# Patient Record
Sex: MALE | HISPANIC OR LATINO | URBAN - METROPOLITAN AREA
[De-identification: names, ages, dates, MRNs, and addresses within clinical notes are randomized per-mention and may not be internally consistent; named-entity substitution may affect disease eponyms.]

---

## 2017-03-25 ENCOUNTER — EMERGENCY (EMERGENCY)
Facility: HOSPITAL | Age: 42
LOS: 0 days | Discharge: HOME | End: 2017-03-25
Attending: EMERGENCY MEDICINE | Admitting: EMERGENCY MEDICINE
Payer: SUBSIDIZED

## 2017-03-25 VITALS
OXYGEN SATURATION: 99 % | HEART RATE: 63 BPM | RESPIRATION RATE: 18 BRPM | DIASTOLIC BLOOD PRESSURE: 104 MMHG | TEMPERATURE: 99 F | SYSTOLIC BLOOD PRESSURE: 153 MMHG

## 2017-03-25 VITALS
HEART RATE: 62 BPM | SYSTOLIC BLOOD PRESSURE: 142 MMHG | RESPIRATION RATE: 18 BRPM | OXYGEN SATURATION: 99 % | DIASTOLIC BLOOD PRESSURE: 88 MMHG | TEMPERATURE: 98 F

## 2017-03-25 DIAGNOSIS — M54.9 DORSALGIA, UNSPECIFIED: ICD-10-CM

## 2017-03-25 DIAGNOSIS — M79.1 MYALGIA: ICD-10-CM

## 2017-03-25 PROCEDURE — 99284 EMERGENCY DEPT VISIT MOD MDM: CPT

## 2017-03-25 PROCEDURE — 71100 X-RAY EXAM RIBS UNI 2 VIEWS: CPT | Mod: 26,RT

## 2017-03-25 PROCEDURE — 71020: CPT | Mod: 26

## 2017-03-25 RX ORDER — IBUPROFEN 200 MG
600 TABLET ORAL ONCE
Qty: 0 | Refills: 0 | Status: COMPLETED | OUTPATIENT
Start: 2017-03-25 | End: 2017-03-25

## 2017-03-25 RX ADMIN — Medication 600 MILLIGRAM(S): at 10:38

## 2017-03-25 NOTE — ED ADULT NURSE NOTE - ED STAT RN HANDOFF DETAILS
Received care of patient from Super Track RN, Lexy Wallace. Patient resting comfortably will continue to monitor. Awaiting test results. Patient in no acute distress at this time.

## 2017-03-25 NOTE — ED STATDOCS - MEDICAL DECISION MAKING DETAILS
40 y/o M p/w right rib pain for months, worse with movement, appears musculoskeletal, will get xrays and give ibuprofen.

## 2017-03-25 NOTE — ED STATDOCS - PROGRESS NOTE DETAILS
41 yr. old male presents to ED with pain in right ribs for 5 months 41 yr. old male presents to ED with pain in right ribs for 5 months and low back pain. No numbness or tingling of extremities. No caudal anesthesia, no incontinence of urine or stool, Has not taken any OTC medications. 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 PE; PERRLA EOMS intact, Neck; non tender ROM; normal, Chest; Lungs clear, tender right mid axillary  rib cage  Abd; non tender  Back; Spine non tender tender lumbar paravetebral muscles ROM; flexion and lateral bend normal SLR; Neg chirag. Plan: Chest X-Ray and Rib series Ibuprofen for pain. Will F/U with X-Ray and re evaluate. [place] ART

## 2017-03-25 NOTE — ED STATDOCS - OBJECTIVE STATEMENT
42 y/o M presents to the ED c/o right rib pain 5 months. Pt states pain is worse with movement. Pt has not taken any OTC medications for pain. Currently pt has no other complaints and denies n/v/d, fever, dysuria, hematuria, CP, and SOB.

## 2017-03-25 NOTE — ED STATDOCS - NS ED MD SCRIBE ATTENDING SCRIBE SECTIONS
HISTORY OF PRESENT ILLNESS/PHYSICAL EXAM/RESULTS/PAST MEDICAL/SURGICAL/SOCIAL HISTORY/DISPOSITION/PROGRESS NOTE/REVIEW OF SYSTEMS

## 2017-03-25 NOTE — ED STATDOCS - ATTENDING CONTRIBUTION TO CARE
I, Fareed Varner, performed the initial face to face bedside interview with this patient regarding history of present illness, review of symptoms and relevant past medical, social and family history.  I completed an independent physical examination.  I was the initial provider who evaluated this patient. I have signed out the follow up of any pending tests (i.e. labs, radiological studies) to the ACP.  I have communicated the patient’s plan of care and disposition with the ACP.  The history, relevant review of systems, past medical and surgical history, medical decision making, and physical examination was documented by the scribe in my presence and I attest to the accuracy of the documentation.

## 2018-03-12 ENCOUNTER — EMERGENCY (EMERGENCY)
Facility: HOSPITAL | Age: 43
LOS: 0 days | Discharge: HOME | End: 2018-03-12
Attending: EMERGENCY MEDICINE | Admitting: EMERGENCY MEDICINE
Payer: MEDICAID

## 2018-03-12 VITALS
HEART RATE: 77 BPM | DIASTOLIC BLOOD PRESSURE: 87 MMHG | RESPIRATION RATE: 16 BRPM | SYSTOLIC BLOOD PRESSURE: 145 MMHG | TEMPERATURE: 98 F | OXYGEN SATURATION: 100 %

## 2018-03-12 VITALS
RESPIRATION RATE: 18 BRPM | HEART RATE: 72 BPM | SYSTOLIC BLOOD PRESSURE: 141 MMHG | OXYGEN SATURATION: 100 % | DIASTOLIC BLOOD PRESSURE: 105 MMHG | TEMPERATURE: 98 F

## 2018-03-12 DIAGNOSIS — R30.0 DYSURIA: ICD-10-CM

## 2018-03-12 DIAGNOSIS — Z11.3 ENCOUNTER FOR SCREENING FOR INFECTIONS WITH A PREDOMINANTLY SEXUAL MODE OF TRANSMISSION: ICD-10-CM

## 2018-03-12 DIAGNOSIS — R35.0 FREQUENCY OF MICTURITION: ICD-10-CM

## 2018-03-12 LAB
ALBUMIN SERPL ELPH-MCNC: 4.4 G/DL — SIGNIFICANT CHANGE UP (ref 3.3–5)
ALP SERPL-CCNC: 123 U/L — HIGH (ref 40–120)
ALT FLD-CCNC: 18 U/L — SIGNIFICANT CHANGE UP (ref 12–78)
ANION GAP SERPL CALC-SCNC: 9 MMOL/L — SIGNIFICANT CHANGE UP (ref 5–17)
APPEARANCE UR: CLEAR — SIGNIFICANT CHANGE UP
AST SERPL-CCNC: 48 U/L — HIGH (ref 15–37)
BACTERIA # UR AUTO: (no result)
BASOPHILS # BLD AUTO: 0.04 K/UL — SIGNIFICANT CHANGE UP (ref 0–0.2)
BASOPHILS NFR BLD AUTO: 0.7 % — SIGNIFICANT CHANGE UP (ref 0–2)
BILIRUB SERPL-MCNC: 0.8 MG/DL — SIGNIFICANT CHANGE UP (ref 0.2–1.2)
BILIRUB UR-MCNC: NEGATIVE — SIGNIFICANT CHANGE UP
BUN SERPL-MCNC: 9 MG/DL — SIGNIFICANT CHANGE UP (ref 7–23)
CALCIUM SERPL-MCNC: 8.9 MG/DL — SIGNIFICANT CHANGE UP (ref 8.5–10.1)
CHLORIDE SERPL-SCNC: 102 MMOL/L — SIGNIFICANT CHANGE UP (ref 96–108)
CO2 SERPL-SCNC: 26 MMOL/L — SIGNIFICANT CHANGE UP (ref 22–31)
COLOR SPEC: YELLOW — SIGNIFICANT CHANGE UP
COMMENT - URINE: SIGNIFICANT CHANGE UP
CREAT SERPL-MCNC: 0.94 MG/DL — SIGNIFICANT CHANGE UP (ref 0.5–1.3)
DIFF PNL FLD: (no result)
EOSINOPHIL # BLD AUTO: 0.04 K/UL — SIGNIFICANT CHANGE UP (ref 0–0.5)
EOSINOPHIL NFR BLD AUTO: 0.7 % — SIGNIFICANT CHANGE UP (ref 0–6)
EPI CELLS # UR: SIGNIFICANT CHANGE UP
GLUCOSE SERPL-MCNC: 102 MG/DL — HIGH (ref 70–99)
GLUCOSE UR QL: NEGATIVE MG/DL — SIGNIFICANT CHANGE UP
HCT VFR BLD CALC: 52.7 % — HIGH (ref 39–50)
HGB BLD-MCNC: 18.8 G/DL — HIGH (ref 13–17)
HYALINE CASTS # UR AUTO: (no result) /LPF
IMM GRANULOCYTES NFR BLD AUTO: 0.7 % — SIGNIFICANT CHANGE UP (ref 0–1.5)
KETONES UR-MCNC: NEGATIVE — SIGNIFICANT CHANGE UP
LEUKOCYTE ESTERASE UR-ACNC: NEGATIVE — SIGNIFICANT CHANGE UP
LYMPHOCYTES # BLD AUTO: 1.43 K/UL — SIGNIFICANT CHANGE UP (ref 1–3.3)
LYMPHOCYTES # BLD AUTO: 23.6 % — SIGNIFICANT CHANGE UP (ref 13–44)
MCHC RBC-ENTMCNC: 31.9 PG — SIGNIFICANT CHANGE UP (ref 27–34)
MCHC RBC-ENTMCNC: 35.7 GM/DL — SIGNIFICANT CHANGE UP (ref 32–36)
MCV RBC AUTO: 89.3 FL — SIGNIFICANT CHANGE UP (ref 80–100)
MONOCYTES # BLD AUTO: 0.54 K/UL — SIGNIFICANT CHANGE UP (ref 0–0.9)
MONOCYTES NFR BLD AUTO: 8.9 % — SIGNIFICANT CHANGE UP (ref 2–14)
NEUTROPHILS # BLD AUTO: 3.97 K/UL — SIGNIFICANT CHANGE UP (ref 1.8–7.4)
NEUTROPHILS NFR BLD AUTO: 65.4 % — SIGNIFICANT CHANGE UP (ref 43–77)
NITRITE UR-MCNC: NEGATIVE — SIGNIFICANT CHANGE UP
NRBC # BLD: 0 /100 WBCS — SIGNIFICANT CHANGE UP (ref 0–0)
PH UR: 5 — SIGNIFICANT CHANGE UP (ref 5–8)
PLATELET # BLD AUTO: 266 K/UL — SIGNIFICANT CHANGE UP (ref 150–400)
POTASSIUM SERPL-MCNC: 4.1 MMOL/L — SIGNIFICANT CHANGE UP (ref 3.5–5.3)
POTASSIUM SERPL-SCNC: 4.1 MMOL/L — SIGNIFICANT CHANGE UP (ref 3.5–5.3)
PROT SERPL-MCNC: 8.5 GM/DL — HIGH (ref 6–8.3)
PROT UR-MCNC: 15 MG/DL
RBC # BLD: 5.9 M/UL — HIGH (ref 4.2–5.8)
RBC # FLD: 12 % — SIGNIFICANT CHANGE UP (ref 10.3–14.5)
RBC CASTS # UR COMP ASSIST: SIGNIFICANT CHANGE UP /HPF (ref 0–4)
SODIUM SERPL-SCNC: 137 MMOL/L — SIGNIFICANT CHANGE UP (ref 135–145)
SP GR SPEC: 1.02 — SIGNIFICANT CHANGE UP (ref 1.01–1.02)
UROBILINOGEN FLD QL: NEGATIVE MG/DL — SIGNIFICANT CHANGE UP
WBC # BLD: 6.06 K/UL — SIGNIFICANT CHANGE UP (ref 3.8–10.5)
WBC # FLD AUTO: 6.06 K/UL — SIGNIFICANT CHANGE UP (ref 3.8–10.5)
WBC UR QL: SIGNIFICANT CHANGE UP

## 2018-03-12 PROCEDURE — 99284 EMERGENCY DEPT VISIT MOD MDM: CPT | Mod: 25

## 2018-03-12 RX ORDER — SODIUM CHLORIDE 9 MG/ML
1000 INJECTION INTRAMUSCULAR; INTRAVENOUS; SUBCUTANEOUS
Qty: 0 | Refills: 0 | Status: DISCONTINUED | OUTPATIENT
Start: 2018-03-12 | End: 2018-03-12

## 2018-03-12 RX ORDER — SODIUM CHLORIDE 9 MG/ML
1000 INJECTION INTRAMUSCULAR; INTRAVENOUS; SUBCUTANEOUS ONCE
Qty: 0 | Refills: 0 | Status: COMPLETED | OUTPATIENT
Start: 2018-03-12 | End: 2018-03-12

## 2018-03-12 RX ORDER — SODIUM CHLORIDE 9 MG/ML
3 INJECTION INTRAMUSCULAR; INTRAVENOUS; SUBCUTANEOUS ONCE
Qty: 0 | Refills: 0 | Status: COMPLETED | OUTPATIENT
Start: 2018-03-12 | End: 2018-03-12

## 2018-03-12 RX ORDER — CEFTRIAXONE 500 MG/1
250 INJECTION, POWDER, FOR SOLUTION INTRAMUSCULAR; INTRAVENOUS ONCE
Qty: 0 | Refills: 0 | Status: COMPLETED | OUTPATIENT
Start: 2018-03-12 | End: 2018-03-12

## 2018-03-12 RX ORDER — AZITHROMYCIN 500 MG/1
1000 TABLET, FILM COATED ORAL ONCE
Qty: 0 | Refills: 0 | Status: COMPLETED | OUTPATIENT
Start: 2018-03-12 | End: 2018-03-12

## 2018-03-12 RX ADMIN — SODIUM CHLORIDE 3 MILLILITER(S): 9 INJECTION INTRAMUSCULAR; INTRAVENOUS; SUBCUTANEOUS at 10:39

## 2018-03-12 RX ADMIN — AZITHROMYCIN 1000 MILLIGRAM(S): 500 TABLET, FILM COATED ORAL at 12:28

## 2018-03-12 RX ADMIN — CEFTRIAXONE 250 MILLIGRAM(S): 500 INJECTION, POWDER, FOR SOLUTION INTRAMUSCULAR; INTRAVENOUS at 12:28

## 2018-03-12 RX ADMIN — SODIUM CHLORIDE 1000 MILLILITER(S): 9 INJECTION INTRAMUSCULAR; INTRAVENOUS; SUBCUTANEOUS at 10:47

## 2018-03-12 NOTE — ED ADULT NURSE NOTE - CHIEF COMPLAINT QUOTE
pt Malawian speaking only information obtained in Malawian , states abd pain for the past 5 days worsening at night. denies n/v/d

## 2018-03-12 NOTE — ED ADULT TRIAGE NOTE - CHIEF COMPLAINT QUOTE
pt Danish speaking only information obtained in Danish , states abd pain for the past 5 days worsening at night. denies n/v/d

## 2018-03-12 NOTE — ED STATDOCS - PROGRESS NOTE DETAILS
43 yo male with a PMH of htn presents with frequent urination and dysuria (primarily at night) x few months. Never was evaluated by anyone for it. Pt sexually active. Denies rashes, discharge, fever, abd pain.  exam: testicles in vertical lie, nontender, no masses or edema. No rashes. -Vic Eng PA-C  ID [id number]. Reevaluated patient at bedside.  Patient feeling much improved.  Discussed the results of all diagnostic testing in ED and copies of all reports given.   An opportunity to ask questions was given.  Discussed the importance of prompt, close medical follow-up.  Patient will return with any changes, concerns or persistent / worsening symptoms.  Understanding of all instructions verbalized.

## 2018-03-12 NOTE — ED STATDOCS - OBJECTIVE STATEMENT
41 y/o male with PMHx of HTN presents to the ED c/o penile burning and frequent urination starting 5 months ago. Pt states pain is worse at night and during urination. Has not seen a PCP for sx. Denies NVD, fever. NKDA.

## 2018-03-13 LAB
C TRACH RRNA SPEC QL NAA+PROBE: SIGNIFICANT CHANGE UP
N GONORRHOEA RRNA SPEC QL NAA+PROBE: SIGNIFICANT CHANGE UP
SPECIMEN SOURCE: SIGNIFICANT CHANGE UP

## 2018-10-02 ENCOUNTER — EMERGENCY (EMERGENCY)
Facility: HOSPITAL | Age: 43
LOS: 0 days | Discharge: HOME | End: 2018-10-02
Attending: EMERGENCY MEDICINE | Admitting: EMERGENCY MEDICINE
Payer: MEDICAID

## 2018-10-02 VITALS
SYSTOLIC BLOOD PRESSURE: 156 MMHG | TEMPERATURE: 99 F | DIASTOLIC BLOOD PRESSURE: 97 MMHG | HEART RATE: 70 BPM | OXYGEN SATURATION: 100 % | RESPIRATION RATE: 18 BRPM

## 2018-10-02 VITALS — WEIGHT: 169.98 LBS | HEIGHT: 62 IN

## 2018-10-02 DIAGNOSIS — S99.921A UNSPECIFIED INJURY OF RIGHT FOOT, INITIAL ENCOUNTER: ICD-10-CM

## 2018-10-02 DIAGNOSIS — S92.314A NONDISPLACED FRACTURE OF FIRST METATARSAL BONE, RIGHT FOOT, INITIAL ENCOUNTER FOR CLOSED FRACTURE: ICD-10-CM
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
## 2018-10-02 DIAGNOSIS — S92.324A NONDISPLACED FRACTURE OF SECOND METATARSAL BONE, RIGHT FOOT, INITIAL ENCOUNTER FOR CLOSED FRACTURE: ICD-10-CM
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
## 2018-10-02 PROCEDURE — 73630 X-RAY EXAM OF FOOT: CPT | Mod: 26,RT

## 2018-10-02 PROCEDURE — 99284 EMERGENCY DEPT VISIT MOD MDM: CPT

## 2018-10-02 RX ORDER — IBUPROFEN 200 MG
600 TABLET ORAL ONCE
Qty: 0 | Refills: 0 | Status: COMPLETED | OUTPATIENT
Start: 2018-10-02 | End: 2018-10-02

## 2018-10-02 RX ADMIN — Medication 600 MILLIGRAM(S): at 13:59

## 2018-10-02 NOTE — ED ADULT TRIAGE NOTE - CHIEF COMPLAINT QUOTE
Patient presents with right foot wound reports he was at work and had rock fall on foot reports able to move toes unable to walk on foot. Patient applied ice directly

## 2018-10-02 NOTE — ED ADULT NURSE NOTE - NSFALLRSKHARMRISK_ED_ALL_ED
Immediate Brief Procedure Note    Patient: Sundeep Garcia    Pre-op Dx: Symptomatic cholelithiasis, probable chronic cholecystitis    Post-op Dx: Same    Procedure: Laporoscopic cholecystectomy with intra-cholangiogram     Surgeon:  Chad Morales MD    Assistants: Yadira Lyn NP    Anesthesia Staff: Anesthesiologist: Iris Ramey MD  Anesthesia Assistant: Andres Priest    Anesthesia Type: Gen.    Findings: Single 1-1/2 cm gallstone within the gallbladder. Normal cholangiogram with flow through all ducts, through ampulla and into duodenum without filling defects    Estimated Blood Loss: Minimal    Complications: None    Specimens Removed: Gallbladder  
no

## 2018-10-02 NOTE — ED STATDOCS - MUSCULOSKELETAL, MLM
Mild TTP to distal aspect of dorsal right foot, ROM of toes limited secondary to pain, FROM of the ankle, cap refill intact

## 2018-10-02 NOTE — ED ADULT NURSE NOTE - NSIMPLEMENTINTERV_GEN_ALL_ED
Implemented All Universal Safety Interventions:  [place] to call system. Call bell, personal items and telephone within reach. Instruct patient to call for assistance. Room bathroom lighting operational. Non-slip footwear when patient is off stretcher. Physically safe environment: no spills, clutter or unnecessary equipment. Stretcher in lowest position, wheels locked, appropriate side rails in place.

## 2018-10-02 NOTE — ED STATDOCS - OBJECTIVE STATEMENT
44 y/o male with a PMHx of HLD presents to the ED c/o right foot injury. Pt notes 2 hours ago, he dropped a stone on his right foot while at work. Pt was wearing work boots during incident. Pt states he is experiencing pain in the foot and is not able to ambulate secondary to pain. NKDA. [place]  #[id number]

## 2018-10-02 NOTE — ED STATDOCS - CARE PLAN
Principal Discharge DX:	Closed nondisplaced fracture of first metatarsal bone of right foot, initial encounter  Secondary Diagnosis:	Closed nondisplaced fracture of second metatarsal bone of right foot, initial encounter

## 2018-10-02 NOTE — ED STATDOCS - PROGRESS NOTE DETAILS
44 yo male presents with R foot pain s/p 100lb stone falling on foot at work approx 2 hours pta. Pt was wearing work boots at the time. Denies f/numbness, tingling. Hematoma over the distal dorsal R foot. XR. Reeval. -Vic Eng PA-C Xr showing 1st and 2nd metatarsal fractures. Podiatry called and will come o see the pt. -Vic Eng PA-C

## 2018-10-02 NOTE — ED STATDOCS - NS_ ATTENDINGSCRIBEDETAILS _ED_A_ED_FT
David Moralez DO (Attending): The history, relevant review of systems, past medical and surgical history, medical decision making, and physical examination was documented by the scribe in my presence and I attest to the accuracy of the documentation.

## 2018-10-12 PROBLEM — E78.5 HYPERLIPIDEMIA, UNSPECIFIED: Chronic | Status: INACTIVE | Noted: 2018-10-02 | Resolved: 2018-10-11

## 2018-11-09 ENCOUNTER — INPATIENT (INPATIENT)
Facility: HOSPITAL | Age: 43
LOS: 4 days | Discharge: HOME HEALTH | End: 2018-11-14
Attending: INTERNAL MEDICINE | Admitting: INTERNAL MEDICINE
Payer: SELF-PAY

## 2018-11-09 VITALS — HEIGHT: 61 IN | WEIGHT: 138.01 LBS

## 2018-11-09 PROBLEM — E78.5 HYPERLIPIDEMIA, UNSPECIFIED: Chronic | Status: ACTIVE | Noted: 2018-10-11

## 2018-11-09 LAB
ALBUMIN SERPL ELPH-MCNC: 3.9 G/DL — SIGNIFICANT CHANGE UP (ref 3.3–5)
ALP SERPL-CCNC: 79 U/L — SIGNIFICANT CHANGE UP (ref 40–120)
ALT FLD-CCNC: 29 U/L — SIGNIFICANT CHANGE UP (ref 12–78)
ANION GAP SERPL CALC-SCNC: 9 MMOL/L — SIGNIFICANT CHANGE UP (ref 5–17)
APTT BLD: 29.1 SEC — SIGNIFICANT CHANGE UP (ref 27.5–36.3)
AST SERPL-CCNC: 30 U/L — SIGNIFICANT CHANGE UP (ref 15–37)
BASOPHILS # BLD AUTO: 0.02 K/UL — SIGNIFICANT CHANGE UP (ref 0–0.2)
BASOPHILS NFR BLD AUTO: 0.3 % — SIGNIFICANT CHANGE UP (ref 0–2)
BILIRUB SERPL-MCNC: 0.8 MG/DL — SIGNIFICANT CHANGE UP (ref 0.2–1.2)
BLD GP AB SCN SERPL QL: SIGNIFICANT CHANGE UP
BUN SERPL-MCNC: 13 MG/DL — SIGNIFICANT CHANGE UP (ref 7–23)
CALCIUM SERPL-MCNC: 8.8 MG/DL — SIGNIFICANT CHANGE UP (ref 8.5–10.1)
CHLORIDE SERPL-SCNC: 105 MMOL/L — SIGNIFICANT CHANGE UP (ref 96–108)
CK SERPL-CCNC: 86 U/L — SIGNIFICANT CHANGE UP (ref 26–308)
CO2 SERPL-SCNC: 27 MMOL/L — SIGNIFICANT CHANGE UP (ref 22–31)
CREAT SERPL-MCNC: 0.8 MG/DL — SIGNIFICANT CHANGE UP (ref 0.5–1.3)
EOSINOPHIL # BLD AUTO: 0.03 K/UL — SIGNIFICANT CHANGE UP (ref 0–0.5)
EOSINOPHIL NFR BLD AUTO: 0.5 % — SIGNIFICANT CHANGE UP (ref 0–6)
GLUCOSE SERPL-MCNC: 93 MG/DL — SIGNIFICANT CHANGE UP (ref 70–99)
HCT VFR BLD CALC: 46.8 % — SIGNIFICANT CHANGE UP (ref 39–50)
HGB BLD-MCNC: 16.6 G/DL — SIGNIFICANT CHANGE UP (ref 13–17)
IMM GRANULOCYTES NFR BLD AUTO: 0.3 % — SIGNIFICANT CHANGE UP (ref 0–1.5)
INR BLD: 1.02 RATIO — SIGNIFICANT CHANGE UP (ref 0.88–1.16)
LYMPHOCYTES # BLD AUTO: 1.27 K/UL — SIGNIFICANT CHANGE UP (ref 1–3.3)
LYMPHOCYTES # BLD AUTO: 21.7 % — SIGNIFICANT CHANGE UP (ref 13–44)
MCHC RBC-ENTMCNC: 32.4 PG — SIGNIFICANT CHANGE UP (ref 27–34)
MCHC RBC-ENTMCNC: 35.5 GM/DL — SIGNIFICANT CHANGE UP (ref 32–36)
MCV RBC AUTO: 91.2 FL — SIGNIFICANT CHANGE UP (ref 80–100)
MONOCYTES # BLD AUTO: 0.53 K/UL — SIGNIFICANT CHANGE UP (ref 0–0.9)
MONOCYTES NFR BLD AUTO: 9.1 % — SIGNIFICANT CHANGE UP (ref 2–14)
NEUTROPHILS # BLD AUTO: 3.98 K/UL — SIGNIFICANT CHANGE UP (ref 1.8–7.4)
NEUTROPHILS NFR BLD AUTO: 68.1 % — SIGNIFICANT CHANGE UP (ref 43–77)
NRBC # BLD: 0 /100 WBCS — SIGNIFICANT CHANGE UP (ref 0–0)
PLATELET # BLD AUTO: 240 K/UL — SIGNIFICANT CHANGE UP (ref 150–400)
POTASSIUM SERPL-MCNC: 3.5 MMOL/L — SIGNIFICANT CHANGE UP (ref 3.5–5.3)
POTASSIUM SERPL-SCNC: 3.5 MMOL/L — SIGNIFICANT CHANGE UP (ref 3.5–5.3)
PROT SERPL-MCNC: 7.5 GM/DL — SIGNIFICANT CHANGE UP (ref 6–8.3)
PROTHROM AB SERPL-ACNC: 11.3 SEC — SIGNIFICANT CHANGE UP (ref 10–12.9)
RBC # BLD: 5.13 M/UL — SIGNIFICANT CHANGE UP (ref 4.2–5.8)
RBC # FLD: 11.9 % — SIGNIFICANT CHANGE UP (ref 10.3–14.5)
SODIUM SERPL-SCNC: 141 MMOL/L — SIGNIFICANT CHANGE UP (ref 135–145)
TYPE + AB SCN PNL BLD: SIGNIFICANT CHANGE UP
WBC # BLD: 5.85 K/UL — SIGNIFICANT CHANGE UP (ref 3.8–10.5)
WBC # FLD AUTO: 5.85 K/UL — SIGNIFICANT CHANGE UP (ref 3.8–10.5)

## 2018-11-09 PROCEDURE — 93010 ELECTROCARDIOGRAM REPORT: CPT

## 2018-11-09 PROCEDURE — 99285 EMERGENCY DEPT VISIT HI MDM: CPT

## 2018-11-09 RX ORDER — OXYCODONE HYDROCHLORIDE 5 MG/1
10 TABLET ORAL ONCE
Qty: 0 | Refills: 0 | Status: DISCONTINUED | OUTPATIENT
Start: 2018-11-09 | End: 2018-11-09

## 2018-11-09 RX ORDER — FENTANYL CITRATE 50 UG/ML
50 INJECTION INTRAVENOUS
Qty: 0 | Refills: 0 | Status: DISCONTINUED | OUTPATIENT
Start: 2018-11-09 | End: 2018-11-09

## 2018-11-09 RX ORDER — ONDANSETRON 8 MG/1
4 TABLET, FILM COATED ORAL EVERY 6 HOURS
Qty: 0 | Refills: 0 | Status: DISCONTINUED | OUTPATIENT
Start: 2018-11-09 | End: 2018-11-14

## 2018-11-09 RX ORDER — HYDROCHLOROTHIAZIDE 25 MG
12.5 TABLET ORAL DAILY
Qty: 0 | Refills: 0 | Status: DISCONTINUED | OUTPATIENT
Start: 2018-11-09 | End: 2018-11-14

## 2018-11-09 RX ORDER — ACETAMINOPHEN 500 MG
1000 TABLET ORAL ONCE
Qty: 0 | Refills: 0 | Status: COMPLETED | OUTPATIENT
Start: 2018-11-09 | End: 2018-11-09

## 2018-11-09 RX ORDER — OXYCODONE HYDROCHLORIDE 5 MG/1
10 TABLET ORAL EVERY 4 HOURS
Qty: 0 | Refills: 0 | Status: DISCONTINUED | OUTPATIENT
Start: 2018-11-09 | End: 2018-11-14

## 2018-11-09 RX ORDER — SODIUM CHLORIDE 9 MG/ML
1000 INJECTION INTRAMUSCULAR; INTRAVENOUS; SUBCUTANEOUS
Qty: 0 | Refills: 0 | Status: DISCONTINUED | OUTPATIENT
Start: 2018-11-09 | End: 2018-11-09

## 2018-11-09 RX ORDER — SODIUM CHLORIDE 9 MG/ML
1000 INJECTION INTRAMUSCULAR; INTRAVENOUS; SUBCUTANEOUS
Qty: 0 | Refills: 0 | Status: DISCONTINUED | OUTPATIENT
Start: 2018-11-09 | End: 2018-11-14

## 2018-11-09 RX ORDER — ACETAMINOPHEN 500 MG
650 TABLET ORAL EVERY 6 HOURS
Qty: 0 | Refills: 0 | Status: DISCONTINUED | OUTPATIENT
Start: 2018-11-09 | End: 2018-11-14

## 2018-11-09 RX ORDER — OXYCODONE HYDROCHLORIDE 5 MG/1
5 TABLET ORAL EVERY 4 HOURS
Qty: 0 | Refills: 0 | Status: DISCONTINUED | OUTPATIENT
Start: 2018-11-09 | End: 2018-11-14

## 2018-11-09 RX ORDER — DOCUSATE SODIUM 100 MG
100 CAPSULE ORAL DAILY
Qty: 0 | Refills: 0 | Status: DISCONTINUED | OUTPATIENT
Start: 2018-11-09 | End: 2018-11-14

## 2018-11-09 RX ORDER — HYDROMORPHONE HYDROCHLORIDE 2 MG/ML
0.5 INJECTION INTRAMUSCULAR; INTRAVENOUS; SUBCUTANEOUS
Qty: 0 | Refills: 0 | Status: DISCONTINUED | OUTPATIENT
Start: 2018-11-09 | End: 2018-11-14

## 2018-11-09 RX ORDER — ENOXAPARIN SODIUM 100 MG/ML
40 INJECTION SUBCUTANEOUS EVERY 24 HOURS
Qty: 0 | Refills: 0 | Status: DISCONTINUED | OUTPATIENT
Start: 2018-11-09 | End: 2018-11-14

## 2018-11-09 RX ORDER — INFLUENZA VIRUS VACCINE 15; 15; 15; 15 UG/.5ML; UG/.5ML; UG/.5ML; UG/.5ML
0.5 SUSPENSION INTRAMUSCULAR ONCE
Qty: 0 | Refills: 0 | Status: COMPLETED | OUTPATIENT
Start: 2018-11-09 | End: 2018-11-10

## 2018-11-09 RX ADMIN — Medication 400 MILLIGRAM(S): at 16:16

## 2018-11-09 RX ADMIN — HYDROMORPHONE HYDROCHLORIDE 0.5 MILLIGRAM(S): 2 INJECTION INTRAMUSCULAR; INTRAVENOUS; SUBCUTANEOUS at 18:13

## 2018-11-09 RX ADMIN — HYDROMORPHONE HYDROCHLORIDE 0.5 MILLIGRAM(S): 2 INJECTION INTRAMUSCULAR; INTRAVENOUS; SUBCUTANEOUS at 18:30

## 2018-11-09 RX ADMIN — Medication 1000 MILLIGRAM(S): at 17:00

## 2018-11-09 RX ADMIN — OXYCODONE HYDROCHLORIDE 10 MILLIGRAM(S): 5 TABLET ORAL at 18:00

## 2018-11-09 RX ADMIN — HYDROMORPHONE HYDROCHLORIDE 0.5 MILLIGRAM(S): 2 INJECTION INTRAMUSCULAR; INTRAVENOUS; SUBCUTANEOUS at 20:57

## 2018-11-09 RX ADMIN — OXYCODONE HYDROCHLORIDE 5 MILLIGRAM(S): 5 TABLET ORAL at 17:00

## 2018-11-09 RX ADMIN — HYDROMORPHONE HYDROCHLORIDE 0.5 MILLIGRAM(S): 2 INJECTION INTRAMUSCULAR; INTRAVENOUS; SUBCUTANEOUS at 20:46

## 2018-11-09 RX ADMIN — SODIUM CHLORIDE 75 MILLILITER(S): 9 INJECTION INTRAMUSCULAR; INTRAVENOUS; SUBCUTANEOUS at 16:17

## 2018-11-09 NOTE — H&P ADULT - NSHPPHYSICALEXAM_GEN_ALL_CORE
ICU Vital Signs Last 24 Hrs    T(F): 98 (09 Nov 2018 16:05), Max: 98.2 (09 Nov 2018 13:29)  HR: 78 (09 Nov 2018 17:30) (76 - 110)  BP: 128/85 (09 Nov 2018 17:30) (128/85 - 161/90)  -  RR: 12 (09 Nov 2018 17:30) (12 - 13)  SpO2: 100% (09 Nov 2018 17:30) (98% - 100%)

## 2018-11-09 NOTE — ED ADULT NURSE NOTE - CHPI ED NUR SYMPTOMS NEG
no stiffness/no difficulty bearing weight/no tingling/no bruising/no back pain/no deformity/no weakness/no fever/no numbness/no abrasion

## 2018-11-09 NOTE — ED PROVIDER NOTE - OBJECTIVE STATEMENT
42 y/o male with PMHx of HLD presents to the ED sent by podiatry s/o right foot swelling s/p fx 6 weeks ago. Pt was sent to the ED to r/o compartment syndrome. Pt had a right foot fx 6 weeks ago s/p injury at work. Denies NVD, fever, numbness, tingling or other sx.

## 2018-11-09 NOTE — CONSULT NOTE ADULT - ATTENDING COMMENTS
Patient was sent to the ER after seeing me in the office today.  Patient had a palpable pulse at the DP and PT.  However patient had signs of compartment syndrome consisting of skin changes, cold feet, pain with active pf and df of toes, inability to bend toes, and hard skin dorsal medial which was noted as relatively supple dorsal lateral.  Patient was sent to ER to measure compartment pressures.  I do not have the appropriate tools in my office.  Once a pressure above 30 was noted he was booked emergently in the ER for fasciotomy.  Patient to be admitted to medicine for pain control.

## 2018-11-09 NOTE — ED PROVIDER NOTE - PROGRESS NOTE DETAILS
Alex MACHUCA for Dr. Ordaz: Spoke to podiatry resident who will come see pt in the ED at bedside. Sushma WINTER: Per podiatry- will take patient to OR; to be admitted to medicine service as podiatry with no admitting privileges; endorsed to Dr. Langley.

## 2018-11-09 NOTE — BRIEF OPERATIVE NOTE - OPERATION/FINDINGS
-Significant amount of blood was drained from the dorsal incision of the right foot (medial to then 2nd metatarsal) and dorsal skin was noted to be less taut.  -Healthy viable soft tissue noted at the incision site of the right foot  -3-0 vicryl used to tie off small bleeders  -DP pulse of right foot palpated after fasciotomy was performed. -Significant amount of blood was drained from the dorsal incision of the right foot (medial to then 2nd metatarsal) and dorsal skin was noted to be less taut.  Probing was performed to release pressures in all compartments that were accessible through the dorsal second met incision going both medial and lateral to the second met and deep to the plantar foot  -Healthy viable soft tissue noted at the incision site of the right foot  -3-0 vicryl used to tie off small bleeders  -DP pulse of right foot palpated after fasciotomy was performed.

## 2018-11-09 NOTE — ED PROVIDER NOTE - MEDICAL DECISION MAKING DETAILS
42 y/o male with known foot fx sent to ED by podiatry to r/o compartment syndrome. Will consult podiatry.

## 2018-11-09 NOTE — ED ADULT NURSE NOTE - NSIMPLEMENTINTERV_GEN_ALL_ED
Implemented All Universal Safety Interventions:  [place] to call system. Call bell, personal items and telephone within reach. Instruct patient to call for assistance. Room bathroom lighting operational. Non-slip footwear when patient is off stretcher. Physically safe environment: no spills, clutter or unnecessary equipment. Stretcher in lowest position, wheels locked, appropriate side rails in place.

## 2018-11-09 NOTE — CHART NOTE - NSCHARTNOTEFT_GEN_A_CORE
44 y/o male seen in the  ED on 11/9 for evaluation of right foot compartment syndrome. Pt's boss is present. Pt speaks Bulgarian and  Melissa Ervin ID#[id number] assisted via telephone. Pt was sent to the ED by Dr. Fareed Porter to rule out compartment syndrome of the right foot. Pt sustained a nondisplaced fracture of the right foot 1st and 2nd metatarsal shafts and was placed in a posterior splint/ Pt was then transitioned into a walking CAM boot. Pt was seen Dr. Porter's office with complaints of not being able to move his right foot toes, having loss of sensation to his right foot and having pain with passive flexion of his right foot toes. Pt denies any pedal complaints to his left foot. Pt denies any f/c/n/v/sob/headache/chest pain/abdominal pain.      44 y/o male was seen at bedside for post op check s/p right foot emergent fasciotomy for the treatment of compartment syndrome earlier this afternoon (11/9/18). Pt is resting comfortably in bed and in NAD. Pt's friend is present bedside. {t states he has minimal to no pain of the right foot. Pt denies any f/c/n/v/sob/headache/chest pain/abdominal pain.    PE:  Pt is AAO x3, NAD  RLE: Surgical dressing on the right foot is clean, dry and intact. No strikethrough noted. All digits of the right foot are warm, well perfused with good color. Right calf is soft and nontender. No right calf squeeze.    -Pt was seen and examined. Pt discussed with attending Dr. Fareed Porter.  -Surgical dressing on the right foot was left intact during this visit.  -Monitor labs and vitals.  -Recommend abx.  -Pain management.  -Care per Medicine, appreciated.   -Podiatry to follow in house. 44 y/o male seen in the  ED on 11/9 for evaluation of right foot compartment syndrome. Pt's boss is present. Pt speaks [place] and  Melissa Ervin ID#[id number] assisted via telephone. Pt was sent to the ED by Dr. Fareed Porter to rule out compartment syndrome of the right foot. Pt sustained a nondisplaced fracture of the right foot 1st and 2nd metatarsal shafts and was placed in a posterior splint/ Pt was then transitioned into a walking CAM boot. Pt was seen Dr. Porter's office with complaints of not being able to move his right foot toes, having loss of sensation to his right foot and having pain with passive flexion of his right foot toes. Pt denies any pedal complaints to his left foot. Pt denies any f/c/n/v/sob/headache/chest pain/abdominal pain.      44 y/o male was seen at bedside for post op check s/p right foot emergent fasciotomy for the treatment of compartment syndrome earlier this afternoon (11/9/18). Pt is resting comfortably in bed and in NAD. Pt's friend is present bedside. He states he has minimal to no pain of the right foot. Pt denies any f/c/n/v/sob/headache/chest pain/abdominal pain.    PE:  Pt is AAO x3, NAD  RLE: Surgical dressing on the right foot is clean, dry and intact. No strikethrough noted. All digits of the right foot are warm, well perfused with good color. Right calf is soft and nontender. No right calf squeeze.    -Pt was seen and examined. Pt discussed with attending Dr. Fareed Porter.  -Surgical dressing on the right foot was left intact during this visit.  -Monitor labs and vitals.  -Recommend abx.  -Pain management.  -Care per Medicine, appreciated.   - We will attempt to obtain a wound vac for patient for secondary wound closure  -Podiatry to follow in house.

## 2018-11-09 NOTE — CONSULT NOTE ADULT - SUBJECTIVE AND OBJECTIVE BOX
CC: Evaluate for right foot compartment syndrome    42 y/o male seen in the  ED on 11/9 for evaluation of right foot compartment syndrome. Pt's boss is present. Pt speaks Latvian and  Melissa Ervin ID#[id number] assisted via telephone. Pt was sent to the ED by Dr. Fareed Porter to rule out compartment syndrome of the right foot. Pt sustained a nondisplaced fracture of the right foot 1st and 2nd metatarsal shafts and was placed in a posterior splint/ Pt was then transitioned into a walking CAM boot. Pt was seen Dr. Porter's office with complaints of not being able to move his right foot toes, having loss of sensation to his right foot and having pain with passive flexion of his right foot toes. Pt denies any pedal complaints to his left foot. Pt denies any f/c/n/v/sob/headache/chest pain/abdominal pain.      PMHx: HTN    MEDICATIONS:  MEDICATIONS  (STANDING):    Allergies: NKFDA    Social Hx: Denies smoking cigarettes. Denies drinking alcohol. Denies recreational drug use.    REVIEW OF SYSTEMS:  CONSTITUTIONAL: No weakness, fevers or chills  EYES/ENT: No visual changes;  No vertigo or throat pain   NECK: No pain or stiffness  RESPIRATORY: No cough, wheezing, hemoptysis; No shortness of breath  CARDIOVASCULAR: No chest pain or palpitations  GASTROINTESTINAL: No abdominal or epigastric pain. No nausea, vomiting, or hematemesis; No diarrhea or constipation. No melena or hematochezia.  GENITOURINARY: No dysuria, frequency or hematuria  NEUROLOGICAL: No numbness or weakness  SKIN: No itching, burning, rashes, or lesions   All other review of systems is negative unless indicated above    Vital Signs Last 24 Hrs  T(C): 36.8 (09 Nov 2018 13:29), Max: 36.8 (09 Nov 2018 13:29)  T(F): 98.2 (09 Nov 2018 13:29), Max: 98.2 (09 Nov 2018 13:29)  HR: 76 (09 Nov 2018 13:29) (76 - 76)  BP: 138/85 (09 Nov 2018 13:29) (138/85 - 138/85)  BP(mean): --  RR: 12 (09 Nov 2018 13:29) (12 - 12)  SpO2: 99% (09 Nov 2018 13:29) (99% - 99%)    PHYSICAL EXAM:    Constitutional: NAD, awake and alert, well-developed  Extremities:   Vascular- DP and PT pulses are palpable B/L. CFT 3sec x10. TG: WNL. Edema noted of the right foot.  Neuro- Light touch sensation is intact B/L.  Derm- Edema noted of the right foto (especially forefoot). No erythema. No open  Ortho- Pain noted upon palpation of the right dorsal foot over the 1st, 2nd and 3rd metatarsals. Unable to actively move right foot digits. Muscle strength is 4/5 in PF, DF, inversion and eversion in the right foot. Negative Kirk/Homans signs    LABS: All Labs Reviewed:                        16.6   5.85  )-----------( 240      ( 09 Nov 2018 11:30 )             46.8     11-09    141  |  105  |  13  ----------------------------<  93  3.5   |  27  |  0.80    Ca    8.8      09 Nov 2018 11:30    TPro  7.5  /  Alb  3.9  /  TBili  0.8  /  DBili  x   /  AST  30  /  ALT  29  /  AlkPhos  79  11-09    PT/INR - ( 09 Nov 2018 11:30 )   PT: 11.3 sec;   INR: 1.02 ratio         PTT - ( 09 Nov 2018 11:30 )  PTT:29.1 sec  CARDIAC MARKERS ( 09 Nov 2018 11:30 )  x     / x     / 86 U/L / x     / x          Blood Culture:     RADIOLOGY/EKG:    DVT PPX: CC: Evaluate for right foot compartment syndrome    44 y/o male seen in the  ED on 11/9 for evaluation of right foot compartment syndrome. Pt's boss is present. Pt speaks Polish and  Melissa Ervin ID#[id number] assisted via telephone. Pt was sent to the ED by Dr. Fareed Porter to rule out compartment syndrome of the right foot. Pt sustained a nondisplaced fracture of the right foot 1st and 2nd metatarsal shafts and was placed in a posterior splint/ Pt was then transitioned into a walking CAM boot. Pt was seen Dr. Porter's office with complaints of not being able to move his right foot toes, having loss of sensation to his right foot and having pain with passive flexion of his right foot toes. Pt denies any pedal complaints to his left foot. Pt denies any f/c/n/v/sob/headache/chest pain/abdominal pain.      PMHx: HTN    MEDICATIONS:  MEDICATIONS  (STANDING):    Allergies: NKFDA    Social Hx: Denies smoking cigarettes. Denies drinking alcohol. Denies recreational drug use.    REVIEW OF SYSTEMS:  CONSTITUTIONAL: No weakness, fevers or chills  EYES/ENT: No visual changes;  No vertigo or throat pain   NECK: No pain or stiffness  RESPIRATORY: No cough, wheezing, hemoptysis; No shortness of breath  CARDIOVASCULAR: No chest pain or palpitations  GASTROINTESTINAL: No abdominal or epigastric pain. No nausea, vomiting, or hematemesis; No diarrhea or constipation. No melena or hematochezia.  GENITOURINARY: No dysuria, frequency or hematuria  NEUROLOGICAL: No numbness or weakness  SKIN: Numbness and swelling of right foot  All other review of systems is negative unless indicated above    Vital Signs Last 24 Hrs  T(C): 36.8 (09 Nov 2018 13:29), Max: 36.8 (09 Nov 2018 13:29)  T(F): 98.2 (09 Nov 2018 13:29), Max: 98.2 (09 Nov 2018 13:29)  HR: 76 (09 Nov 2018 13:29) (76 - 76)  BP: 138/85 (09 Nov 2018 13:29) (138/85 - 138/85)  BP(mean): --  RR: 12 (09 Nov 2018 13:29) (12 - 12)  SpO2: 99% (09 Nov 2018 13:29) (99% - 99%)    PHYSICAL EXAM:    Constitutional: NAD, awake and alert, well-developed  Extremities:   Vascular- DP and PT pulses are palpable B/L. CFT 3sec x10. TG: WNL. Edema noted of the right foot.  Neuro- Light touch sensation is intact B/L.  Derm- Edema noted of the right foot. No erythema. No open open lesions. No actue signs of infection.  Ortho- Pain noted upon palpation of the right dorsal foot over the 1st, 2nd and 3rd metatarsals. Unable to actively move right foot digits. Muscle strength is 4/5 in PF, DF, inversion and eversion in the right foot. Negative Kirk/Homans signs    LABS: All Labs Reviewed:                        16.6   5.85  )-----------( 240      ( 09 Nov 2018 11:30 )             46.8     11-09    141  |  105  |  13  ----------------------------<  93  3.5   |  27  |  0.80    Ca    8.8      09 Nov 2018 11:30    TPro  7.5  /  Alb  3.9  /  TBili  0.8  /  DBili  x   /  AST  30  /  ALT  29  /  AlkPhos  79  11-09    PT/INR - ( 09 Nov 2018 11:30 )   PT: 11.3 sec;   INR: 1.02 ratio         PTT - ( 09 Nov 2018 11:30 )  PTT:29.1 sec  CARDIAC MARKERS ( 09 Nov 2018 11:30 )  x     / x     / 86 U/L / x     / x          Blood Culture: CC: Evaluate for right foot compartment syndrome    42 y/o male seen in the  ED on 11/9 for evaluation of right foot compartment syndrome. Pt's boss is present. Pt speaks [place] Island[place] and  Melissa Ervin ID#[id number] assisted via telephone. Pt was sent to the ED by Dr. Fareed Porter to rule out compartment syndrome of the right foot. Pt sustained a nondisplaced fracture of the right foot 1st and 2nd metatarsal shafts and was placed in a posterior splint/ Pt was then transitioned into a walking CAM boot. Pt was seen Dr. Porter's office with complaints of not being able to move his right foot toes, having loss of sensation to his right foot and having pain with passive flexion of his right foot toes. Pt denies any pedal complaints to his left foot. Pt denies any f/c/n/v/sob/headache/chest pain/abdominal pain.      PMHx: HTN    MEDICATIONS:  MEDICATIONS  (STANDING):    Allergies: NKFDA    Social Hx: Denies smoking cigarettes. Denies drinking alcohol. Denies recreational drug use.    REVIEW OF SYSTEMS:  CONSTITUTIONAL: No weakness, fevers or chills  EYES/ENT: No visual changes;  No vertigo or throat pain   NECK: No pain or stiffness  RESPIRATORY: No cough, wheezing, hemoptysis; No shortness of breath  CARDIOVASCULAR: No chest pain or palpitations  GASTROINTESTINAL: No abdominal or epigastric pain. No nausea, vomiting, or hematemesis; No diarrhea or constipation. No melena or hematochezia.  GENITOURINARY: No dysuria, frequency or hematuria  NEUROLOGICAL: No numbness or weakness  SKIN: Numbness and swelling of right foot  All other review of systems is negative unless indicated above    Vital Signs Last 24 Hrs  T(C): 36.8 (09 Nov 2018 13:29), Max: 36.8 (09 Nov 2018 13:29)  T(F): 98.2 (09 Nov 2018 13:29), Max: 98.2 (09 Nov 2018 13:29)  HR: 76 (09 Nov 2018 13:29) (76 - 76)  BP: 138/85 (09 Nov 2018 13:29) (138/85 - 138/85)  BP(mean): --  RR: 12 (09 Nov 2018 13:29) (12 - 12)  SpO2: 99% (09 Nov 2018 13:29) (99% - 99%)    PHYSICAL EXAM:    Constitutional: NAD, awake and alert, well-developed  Extremities:   Vascular- DP and PT pulses are palpable B/L. CFT 3sec x10. TG: WNL. Edema noted of the right foot.  Neuro- Light touch sensation is intact B/L.  Derm- Edema noted of the right foot. No erythema. No open open lesions. No actue signs of infection.  Ortho- Pain noted upon palpation of the right dorsal foot over the 1st, 2nd and 3rd metatarsals. Unable to actively move right foot digits. Muscle strength is 4/5 in PF, DF, inversion and eversion in the right foot. Negative Kirk/Homans signs    LABS: All Labs Reviewed:                        16.6   5.85  )-----------( 240      ( 09 Nov 2018 11:30 )             46.8     11-09    141  |  105  |  13  ----------------------------<  93  3.5   |  27  |  0.80    Ca    8.8      09 Nov 2018 11:30    TPro  7.5  /  Alb  3.9  /  TBili  0.8  /  DBili  x   /  AST  30  /  ALT  29  /  AlkPhos  79  11-09    PT/INR - ( 09 Nov 2018 11:30 )   PT: 11.3 sec;   INR: 1.02 ratio         PTT - ( 09 Nov 2018 11:30 )  PTT:29.1 sec  CARDIAC MARKERS ( 09 Nov 2018 11:30 )  x     / x     / 86 U/L / x     / x CC: Evaluate for right foot compartment syndrome    44 y/o male seen in the  ED on 11/9 for evaluation of right foot compartment syndrome. Pt's boss is present. Pt speaks [place]ian and  Melissa Ervin ID#[id number] assisted via telephone. Pt was sent to the ED by Dr. Fareed Porter to rule out compartment syndrome of the right foot. Pt sustained a nondisplaced fracture of the right foot 1st and 2nd metatarsal shafts and was placed in a posterior splint/ Pt was then transitioned into a walking CAM boot. Pt was seen Dr. Porter's office with acute complaints of not being able to move his right foot toes, having loss of sensation to his right foot and having pain with passive flexion of his right foot toes.  Pt denies any pedal complaints to his left foot. Pt denies any f/c/n/v/sob/headache/chest pain/abdominal pain.      PMHx: HTN    MEDICATIONS:  MEDICATIONS  (STANDING):    Allergies: NKFDA    Social Hx: Denies smoking cigarettes. Denies drinking alcohol. Denies recreational drug use.    REVIEW OF SYSTEMS:  CONSTITUTIONAL: No weakness, fevers or chills  EYES/ENT: No visual changes;  No vertigo or throat pain   NECK: No pain or stiffness  RESPIRATORY: No cough, wheezing, hemoptysis; No shortness of breath  CARDIOVASCULAR: No chest pain or palpitations  GASTROINTESTINAL: No abdominal or epigastric pain. No nausea, vomiting, or hematemesis; No diarrhea or constipation. No melena or hematochezia.  GENITOURINARY: No dysuria, frequency or hematuria  NEUROLOGICAL: No numbness or weakness  SKIN: Numbness and swelling of right foot  All other review of systems is negative unless indicated above    Vital Signs Last 24 Hrs  T(C): 36.8 (09 Nov 2018 13:29), Max: 36.8 (09 Nov 2018 13:29)  T(F): 98.2 (09 Nov 2018 13:29), Max: 98.2 (09 Nov 2018 13:29)  HR: 76 (09 Nov 2018 13:29) (76 - 76)  BP: 138/85 (09 Nov 2018 13:29) (138/85 - 138/85)  BP(mean): --  RR: 12 (09 Nov 2018 13:29) (12 - 12)  SpO2: 99% (09 Nov 2018 13:29) (99% - 99%)    PHYSICAL EXAM:    Constitutional: NAD, awake and alert, well-developed  Extremities:   Vascular- DP and PT pulses are palpable B/L. CFT 3sec x10. TG: WNL. Edema noted of the right foot.  Neuro- Light touch sensation is intact B/L.  Derm- Edema noted of the right foot. No erythema. No open open lesions. No actue signs of infection.  Ortho- Pain noted upon palpation of the right dorsal foot over the 1st, 2nd and 3rd metatarsals. Unable to actively move right foot digits. Muscle strength is 4/5 in PF, DF, inversion and eversion in the right foot. Negative Kirk/Homans signs    LABS: All Labs Reviewed:                        16.6   5.85  )-----------( 240      ( 09 Nov 2018 11:30 )             46.8     11-09    141  |  105  |  13  ----------------------------<  93  3.5   |  27  |  0.80    Ca    8.8      09 Nov 2018 11:30    TPro  7.5  /  Alb  3.9  /  TBili  0.8  /  DBili  x   /  AST  30  /  ALT  29  /  AlkPhos  79  11-09    PT/INR - ( 09 Nov 2018 11:30 )   PT: 11.3 sec;   INR: 1.02 ratio         PTT - ( 09 Nov 2018 11:30 )  PTT:29.1 sec  CARDIAC MARKERS ( 09 Nov 2018 11:30 )  x     / x     / 86 U/L / x     / x

## 2018-11-09 NOTE — H&P ADULT - [facility]
"Anesthesia Post Evaluation    Patient: Dina Martínez    Procedure(s) Performed: Procedure(s) (LRB):  ESOPHAGOGASTRODUODENOSCOPY (EGD) (N/A)    Final Anesthesia Type: MAC  Patient location during evaluation: GI PACU  Patient participation: Yes- Able to Participate  Level of consciousness: awake and alert and oriented  Post-procedure vital signs: reviewed and stable  Pain management: adequate  Airway patency: patent  PONV status at discharge: No PONV  Anesthetic complications: no      Cardiovascular status: blood pressure returned to baseline, hemodynamically stable and stable  Respiratory status: unassisted, spontaneous ventilation and room air  Hydration status: euvolemic  Follow-up not needed.        Visit Vitals  /72   Pulse 73   Temp 37 °C (98.6 °F)   Resp 16   Ht 5' 6" (1.676 m)   Wt 125.2 kg (276 lb)   LMP 10/25/2015   SpO2 100%   Breastfeeding? No   BMI 44.55 kg/m²       Pain/Barrett Score: Pain Assessment Performed: Yes (6/27/2018 10:58 AM)  Presence of Pain: denies (6/27/2018 10:58 AM)      " regular rate and rhythm/no murmur

## 2018-11-09 NOTE — BRIEF OPERATIVE NOTE - PROCEDURE
<<-----Click on this checkbox to enter Procedure Decompressive fasciotomy of foot  11/09/2018  Emergent decompressive fasciotomy of the right foot  Active  PHAMPAPUR

## 2018-11-09 NOTE — ED ADULT NURSE NOTE - OBJECTIVE STATEMENT
pt sent from ortho for possible compartment syndrome to right foot. Pt's boss at bedside states pt fractured right foot 5 weeks ago placed on aircast, presents with swelling to right foot. Ortho at bedside at this time.

## 2018-11-09 NOTE — H&P ADULT - NSHPSOCIALHISTORY_GEN_ALL_CORE
No tpb/ETOH/drug use. Pt works in [employer].  No tob/drug use.  He reports 1 beer every 3 months.

## 2018-11-09 NOTE — CONSULT NOTE ADULT - ASSESSMENT
44 y/o male with PMH of HTN was seen for:    1. Compartment syndrome; right foot  2. Hx of nondisplaced 1st and 2nd met fractures; right foot  3. Pain in limb; right foot  4. Difficulty in walking    -Pt was seen and examined with attending Dr. Fareed Porter.  -Compartment pressures of the right foot were measured with ED compartment pressure set. Compartment pressures of the right foot are as followed: Lateral- 21; Interosseous (webspace 2)- 20; Interosseous (webspace 3)- 21; Interosseous (webspace 4)-20; Interspace between met 1 and 2 superficial/dorsal- 50; Interspace between met 1 and 2 deep-12; Central-28; Medial-23.  -Pressure in the interspace between met 1 and 2 superficial/dorsal of the right foot is noted to be very elevated at 50mmHg; pt requires immediate emergent fasciotomy of the right foot to relieve intracompartmental pressure.  -Plan is to admit pt under the medicine service for pain management and medical management after the emergent fasciotomy of the right foot. Pt has been NPO since last night and will be taken immediately to the OR. Pt has been examined by the ED physician who will place admitting orders. Pt to go to the hospital floor after emergent surgical intervention.  -Labs ordered. Will continue to monitor labs and vitals.  -Care per Medicine, appreciated.  -Podiatry to follow pt in house. 44 y/o male with PMH of HTN was seen for:    1. Compartment syndrome; right foot  2. Hx of nondisplaced 1st and 2nd met fractures; right foot  3. Pain in limb; right foot  4. Difficulty in walking    -Pt was seen and examined with attending Dr. Fareed Porter.  -Compartment pressures of the right foot were measured with ED compartment pressure set. Compartment pressures of the right foot are as followed: Lateral- 21; Interosseous (webspace 2)- 20; Interosseous (webspace 3)- 21; Interosseous (webspace 4)-20; Interspace between met 1 and 2 superficial/dorsal- 50; Interspace between met 1 and 2 deep-12; Central-28; Medial-23.  -Pressure in the interspace between met 1 and 2 superficial/dorsal of the right foot is noted to be very elevated at 50mmHg; pt requires immediate emergent fasciotomy of the right foot to relieve intracompartmental pressure.  OR was called by Dr. Porter to add patient to the schedule as an emergent case.    -Plan is to admit pt under the medicine service for pain management and medical management after the emergent fasciotomy of the right foot. Pt has been NPO since last night and will be taken immediately to the OR. Pt has been examined by the ED physician who will place admitting orders. Pt to go to the hospital floor after emergent surgical intervention.  -Labs ordered. Will continue to monitor labs and vitals.  -Care per Medicine, appreciated.  -Podiatry to follow pt in house.

## 2018-11-09 NOTE — H&P ADULT - ASSESSMENT
Pt is admitted w/    I. Compartment syndrome of the Right foot  - s/p OR  surgery by podiatry  - care per podiatry  - antibiotics    II.       III. DVT prophylaxis  - heparin    IV. IMPROVE VTE Individual Risk Assessment    RISK                                                                Points    [  ] Previous VTE                                                  3    [  ] Thrombophilia                                               2    [  ] Lower limb paralysis                                      2        (unable to hold up >15 seconds)      [  ] Current Cancer                                              2         (within 6 months)    [  ] Immobilization > 24 hrs                                1    [  ] ICU/CCU stay > 24 hours                              1    [X  ] Age > 60                                                      1    IMPROVE VTE Score ___1-2______ Pt is a 44 y/o gentleman  with PMHx of HTN on hydrochlorothiazide who  was sent to the ED   by podiatry w/ right foot pain and swelling .  Pt was sent to the ED to r/o compartment syndrome. Pt  had a right foot fx 6 weeks ago s/p injury at work.   He had an air cast initially for R 1st and 2nd metatarsal fx.  He reports he had occas discomfort then today he had severe pain.     Pt  denies f, chills, NVD, fever, numbness, tingling .    Pt is admitted w/    I. Compartment syndrome of the Right foot  - s/p OR  surgery by podiatry  - care per podiatry  - antibiotics given with Ancef  - pain control  - ID consult    II. HTN  - cont meds    III. DVT prophylaxis  - heparin    IV. IMPROVE VTE Individual Risk Assessment    RISK                                                                Points    [  ] Previous VTE                                                  3    [  ] Thrombophilia                                               2    [  ] Lower limb paralysis                                      2        (unable to hold up >15 seconds)      [  ] Current Cancer                                              2         (within 6 months)    [  ] Immobilization > 24 hrs                                1    [  ] ICU/CCU stay > 24 hours                              1    [  ] Age > 60                                                      1    IMPROVE VTE Score ___0-1____

## 2018-11-09 NOTE — H&P ADULT - HISTORY OF PRESENT ILLNESS
44 y/o male with PMHx of HLD presents to the ED sent by podiatry s/o right foot swelling s/p fx 6 weeks ago. Pt was sent to the ED to r/o compartment syndrome. Pt had a right foot fx 6 weeks ago s/p injury at work. Denies NVD, fever, numbness, tingling or other sx. Pt is a 42 y/o gentleman  with PMHx of HTN on hydrochlorothiazide who  was sent to the ED   by podiatry w/ right foot pain and swelling .  Pt was sent to the ED to r/o compartment syndrome. Pt  had a right foot fx 6 weeks ago s/p injury at work.   He had an air cast initially for R 1st and 2nd metatarsal fx.  He reports he had occas discomfort then today he had severe pain.     Pt  denies f, chills, NVD, fever, numbness, tingling .    Fam Hx:   Mother alive at 62  Father alive at 65  Cousin DM

## 2018-11-09 NOTE — PATIENT PROFILE ADULT - STATED REASON FOR ADMISSION
the doctor told me there was fluid in my foot the doctor told me there was fluid in my foot and I should come to get it drained

## 2018-11-09 NOTE — ED PROVIDER NOTE - MUSCULOSKELETAL, MLM
Spine appears normal, range of motion is not limited, no muscle or joint tenderness. +mild swelling to dorsum of right foot. Spine appears normal, range of motion is not limited; +moderate swelling and tenderness to dorsum of right foot; difficulty with extension of toes 2/2 to pain

## 2018-11-10 LAB
ANION GAP SERPL CALC-SCNC: 9 MMOL/L — SIGNIFICANT CHANGE UP (ref 5–17)
BUN SERPL-MCNC: 15 MG/DL — SIGNIFICANT CHANGE UP (ref 7–23)
CALCIUM SERPL-MCNC: 8.2 MG/DL — LOW (ref 8.5–10.1)
CHLORIDE SERPL-SCNC: 106 MMOL/L — SIGNIFICANT CHANGE UP (ref 96–108)
CO2 SERPL-SCNC: 27 MMOL/L — SIGNIFICANT CHANGE UP (ref 22–31)
CREAT SERPL-MCNC: 0.83 MG/DL — SIGNIFICANT CHANGE UP (ref 0.5–1.3)
GLUCOSE SERPL-MCNC: 97 MG/DL — SIGNIFICANT CHANGE UP (ref 70–99)
POTASSIUM SERPL-MCNC: 3.5 MMOL/L — SIGNIFICANT CHANGE UP (ref 3.5–5.3)
POTASSIUM SERPL-SCNC: 3.5 MMOL/L — SIGNIFICANT CHANGE UP (ref 3.5–5.3)
SODIUM SERPL-SCNC: 142 MMOL/L — SIGNIFICANT CHANGE UP (ref 135–145)

## 2018-11-10 RX ADMIN — OXYCODONE HYDROCHLORIDE 5 MILLIGRAM(S): 5 TABLET ORAL at 22:48

## 2018-11-10 RX ADMIN — OXYCODONE HYDROCHLORIDE 5 MILLIGRAM(S): 5 TABLET ORAL at 06:01

## 2018-11-10 RX ADMIN — OXYCODONE HYDROCHLORIDE 5 MILLIGRAM(S): 5 TABLET ORAL at 05:31

## 2018-11-10 RX ADMIN — Medication 1 TABLET(S): at 18:43

## 2018-11-10 RX ADMIN — ENOXAPARIN SODIUM 40 MILLIGRAM(S): 100 INJECTION SUBCUTANEOUS at 05:32

## 2018-11-10 RX ADMIN — SODIUM CHLORIDE 75 MILLILITER(S): 9 INJECTION INTRAMUSCULAR; INTRAVENOUS; SUBCUTANEOUS at 22:48

## 2018-11-10 RX ADMIN — INFLUENZA VIRUS VACCINE 0.5 MILLILITER(S): 15; 15; 15; 15 SUSPENSION INTRAMUSCULAR at 05:32

## 2018-11-10 RX ADMIN — Medication 12.5 MILLIGRAM(S): at 05:32

## 2018-11-10 RX ADMIN — Medication 100 MILLIGRAM(S): at 11:28

## 2018-11-10 RX ADMIN — OXYCODONE HYDROCHLORIDE 5 MILLIGRAM(S): 5 TABLET ORAL at 23:18

## 2018-11-10 RX ADMIN — SODIUM CHLORIDE 75 MILLILITER(S): 9 INJECTION INTRAMUSCULAR; INTRAVENOUS; SUBCUTANEOUS at 05:33

## 2018-11-10 NOTE — CONSULT NOTE ADULT - SUBJECTIVE AND OBJECTIVE BOX
Patient is a 43y old  Male who presents with a chief complaint of compartment syndrome of the foot (10 Nov 2018 13:34)    HPI:  Pt is a 42 y/o male with PMHx of HTN on hydrochlorothiazide admitted on 11/9 for evaluation of right foot pain and swelling after being seen in podiatrist office s/p injury to foot at work 6 weeks ago, initially treated with air cast for right first and second metatarsal fractures. Upon admission found to have compartment syndrome and went to OR for fasciotomy; has surgical dressing placed post op. Never had fever, chills. Only had severe pain prior to surgery.          PMH: as above  PSH: as above  Meds: per reconciliation sheet, noted below  MEDICATIONS  (STANDING):  docusate sodium 100 milliGRAM(s) Oral daily  enoxaparin Injectable 40 milliGRAM(s) SubCutaneous every 24 hours  hydrochlorothiazide 12.5 milliGRAM(s) Oral daily  sodium chloride 0.9%. 1000 milliLiter(s) (75 mL/Hr) IV Continuous <Continuous>    MEDICATIONS  (PRN):  acetaminophen   Tablet .. 650 milliGRAM(s) Oral every 6 hours PRN Mild Pain (1 - 3)  HYDROmorphone  Injectable 0.5 milliGRAM(s) IV Push every 3 hours PRN Moderate Pain (4 - 6)  ondansetron Injectable 4 milliGRAM(s) IV Push every 6 hours PRN Nausea and/or Vomiting  oxyCODONE    IR 5 milliGRAM(s) Oral every 4 hours PRN Mild Pain (1 - 3)  oxyCODONE    IR 10 milliGRAM(s) Oral every 4 hours PRN Severe Pain (7 - 10)    Allergies    No Known Allergies    Intolerances      Social: no smoking, no alcohol, no illegal drugs; no recent travel, no exposure to TB  FAMILY HISTORY:  No pertinent family history in first degree relatives     no history of premature cardiovascular disease in first degree relatives  ROS: the patient denies fever, no chills, no HA, no dizziness, no sore throat, no blurry vision, no CP, no palpitations, no SOB, no cough, no abdominal pain, no diarrhea, no N/V, no dysuria,  no claudication, no rash, no joint aches, no rectal pain or bleeding, no night sweats  All other systems reviewed and are negative    Vital Signs Last 24 Hrs  T(C): 37.1 (10 Nov 2018 11:46), Max: 37.1 (10 Nov 2018 11:46)  T(F): 98.8 (10 Nov 2018 11:46), Max: 98.8 (10 Nov 2018 11:46)  HR: 71 (10 Nov 2018 11:46) (70 - 88)  BP: 104/65 (10 Nov 2018 11:46) (104/65 - 137/73)  BP(mean): --  RR: 16 (10 Nov 2018 11:46) (12 - 17)  SpO2: 95% (10 Nov 2018 11:46) (95% - 99%)  Daily     Daily     PE:    Constitutional: frail looking  HEENT: NC/AT, EOMI, PERRLA, conjunctivae clear; ears and nose atraumatic; pharynx clear  Neck: supple; thyroid not palpable  Back: no tenderness  Respiratory: respiratory effort normal; clear to auscultation  Cardiovascular: S1S2 regular, no murmurs  Abdomen: soft, not tender, not distended, positive BS; no liver or spleen organomegaly  Genitourinary: no suprapubic tenderness  Musculoskeletal: no muscle tenderness, right foot in surgical dressing which I could not remove  Neurological/ Psychiatric: AxOx3, judgement and insight normal;  moving all extremities  Skin: no rashes; no palpable lesions    Labs: all available labs reviewed                        16.6   5.85  )-----------( 240      ( 09 Nov 2018 11:30 )             46.8     11-10    142  |  106  |  15  ----------------------------<  97  3.5   |  27  |  0.83    Ca    8.2<L>      10 Nov 2018 06:00    TPro  7.5  /  Alb  3.9  /  TBili  0.8  /  DBili  x   /  AST  30  /  ALT  29  /  AlkPhos  79  11-09     LIVER FUNCTIONS - ( 09 Nov 2018 11:30 )  Alb: 3.9 g/dL / Pro: 7.5 gm/dL / ALK PHOS: 79 U/L / ALT: 29 U/L / AST: 30 U/L / GGT: x                 Radiology: all available radiological tests reviewed    Advanced directives addressed: full resuscitation

## 2018-11-10 NOTE — PROGRESS NOTE ADULT - SUBJECTIVE AND OBJECTIVE BOX
PCP- [facility] [place]    CC- compartment syndrome RT foot    HPI:  Pt is a 44 y/o gentleman  with PMHx of HTN on hydrochlorothiazide who  was sent to the ED   by podiatry w/ right foot pain and swelling .  Pt was sent to the ED to r/o compartment syndrome. Pt  had a right foot fx 6 weeks ago s/p injury at work.   He had an air cast initially for R 1st and 2nd metatarsal fx.  He reports he had occas discomfort then today he had severe pain. Pt  denies f, chills, NVD, fever, numbness, tingling .    Fam Hx:  Mother alive at 62  Father alive at 65  Cousin DM (09 Nov 2018 17:56)    11/10/18- RT foot dressing dry, denies pain. No new complaints    Review of system- All 10 systems reviewed and is as per HPI otherwise negative.     T(C): 37.1 (11-10-18 @ 11:46), Max: 37.1 (11-10-18 @ 11:46)  HR: 71 (11-10-18 @ 11:46) (70 - 110)  BP: 104/65 (11-10-18 @ 11:46) (104/65 - 161/90)  RR: 16 (11-10-18 @ 11:46) (12 - 17)  SpO2: 95% (11-10-18 @ 11:46) (95% - 100%)  Wt(kg): --    LABS:                        16.6   5.85  )-----------( 240      ( 09 Nov 2018 11:30 )             46.8     11-10    142  |  106  |  15  ----------------------------<  97  3.5   |  27  |  0.83    Ca    8.2<L>      10 Nov 2018 06:00    TPro  7.5  /  Alb  3.9  /  TBili  0.8  /  DBili  x   /  AST  30  /  ALT  29  /  AlkPhos  79  11-09    PT/INR - ( 09 Nov 2018 11:30 )   PT: 11.3 sec;   INR: 1.02 ratio      PTT - ( 09 Nov 2018 11:30 )  PTT:29.1 sec    CARDIAC MARKERS ( 09 Nov 2018 11:30 )  x     / x     / 86 U/L / x     / x        RADIOLOGY & ADDITIONAL TESTS:    PHYSICAL EXAM:  GENERAL: NAD, well-groomed, well-developed  HEAD:  Atraumatic, Normocephalic  EYES: EOMI, PERRLA, conjunctiva and sclera clear  HEENT: Moist mucous membranes  NECK: Supple, No JVD  NERVOUS SYSTEM:  Alert & Oriented X3, Motor Strength 5/5 B/L upper and lower extremities; DTRs 2+ intact and symmetric  CHEST/LUNG: Clear to auscultation bilaterally; No rales, rhonchi, wheezing, or rubs  HEART: Regular rate and rhythm; No murmurs, rubs, or gallops  ABDOMEN: Soft, Nontender, Nondistended; Bowel sounds present  GENITOURINARY- Voiding, no palpable bladder  EXTREMITIES:  2+ Peripheral Pulses, No clubbing, cyanosis, or edema  MUSCULOSKELTAL- RT foot dressing dry  SKIN-no rash, no lesion  CNS- alert, oriented X3, non focal     acetaminophen   Tablet .. 650 milliGRAM(s) Oral every 6 hours PRN  docusate sodium 100 milliGRAM(s) Oral daily  enoxaparin Injectable 40 milliGRAM(s) SubCutaneous every 24 hours  hydrochlorothiazide 12.5 milliGRAM(s) Oral daily  HYDROmorphone  Injectable 0.5 milliGRAM(s) IV Push every 3 hours PRN  ondansetron Injectable 4 milliGRAM(s) IV Push every 6 hours PRN  oxyCODONE    IR 5 milliGRAM(s) Oral every 4 hours PRN  oxyCODONE    IR 10 milliGRAM(s) Oral every 4 hours PRN  sodium chloride 0.9%. 1000 milliLiter(s) IV Continuous <Continuous>    Assessment/Plan  #Compartment syndrome RT foot s/p decompression/fasciotomy  Podiatry f/u appreciated  Wound care as per podiatry  WIll need wound vac  Pain meds prn  OOB to ambulate in a surgical shoe    #HTN- stable    #Dispo- likely home with wound vac on Monday once all arranged

## 2018-11-10 NOTE — PROGRESS NOTE ADULT - ATTENDING COMMENTS
Patient was seen bedside with residents this AM.  He was resting comfortable in bed.  We had a nursing assistant help translate because patient is Danish speaking.  He relates that his overall pain is improved.  The majority of his pain was plantar medial at the area of decompression.  He had minimal to no pain over the dorsum of the foot including the areas that were of max tenderness pre-op.  Those include the dorsal 1st and second met and less so dorsally to mets 3-5.  Patient had minimal to no pain with df and pf of the toes.  His skin appearance in all areas was improved.  The foot is supple in all areas.  Exposed soft tissue is healthy with no evidence of necrosis.    Plan going forward would be to obtain a wound vac to assist in closure.  Once obtained patient is stable for d/c.  If it will take more then 24+ hours to obtain then Patient can be d/c

## 2018-11-10 NOTE — CONSULT NOTE ADULT - ASSESSMENT
Pt is a 44 y/o male with PMHx of HTN on hydrochlorothiazide admitted on 11/9 for evaluation of right foot pain and swelling after being seen in podiatrist office s/p injury to foot at work 6 weeks ago, initially treated with air cast for right first and second metatarsal fractures. Upon admission found to have compartment syndrome and went to OR for fasciotomy; has surgical dressing placed post op. Never had fever, chills. Only had severe pain prior to surgery.  1. Patient is s/p fasciotomy for compartment syndrome  - wound care per podiatry  - unclear if need for antibiotics as patient underwent surgery which is treatment for such; however longer duration of compartment syndrome may put patient at risk for anaerobic organisms causing a soft tissue infection, therefore will start augmentin 875 mg po q 12 hours, for 7 days  - will discuss with podiatry

## 2018-11-10 NOTE — PROGRESS NOTE ADULT - ASSESSMENT
Assessment and Plan:    1. s/p right foot decompression fascitomy for Compartment syndrome of the right foot on 11/9/18.  2. Hx of nondisplaced 1st and 2nd met fractures; right foot  3. Pain in limb; right foot  4. Difficulty in walking    Plan:  -Pt was seen and examined s/p right foot decompression fasciotomy for compression syndrom of the right foot on 11/9/18 with attending Dr. Fareed Porter present.   -Surgical dressing on the right foot was removed and surgical site was evaluated.   - New dressing was applied to the right foot surgical site, composed of adaptic, 4x4 gauze, ABD pad, Kerlix and ACE bandage to right foot.  -Monitor labs and vitals.  - Rec. ID consult.   -Pain management as per med, appreciated.  -Care per Medicine, appreciated.   - Plan is for Wound VAC to be applied to patient's surgical wound to facilitate wound healing process. According to Nursing administration at this moment all hospital VAC machines are being occupied. Will F/U with nursing administration.  - Spoke with Case management about process of getting home VAC for patient. Will begin to fill out paperwork for home VAC request. F/U with case management.  - Surgical shoe left at patient's bedside for him to use whenever ambulating.   -Podiatry to follow in house.

## 2018-11-10 NOTE — PROGRESS NOTE ADULT - SUBJECTIVE AND OBJECTIVE BOX
Date of Service: 11/10/18    HPI:    44 y/o male seen in the  ED on 11/9 for evaluation of right foot compartment syndrome. Pt's boss is present. Pt speaks Iraqi and  Melissa Adamsdwayne ID#[id number] assisted via telephone. Pt was sent to the ED by Dr. Fareed Porter to rule out compartment syndrome of the right foot. Pt sustained a nondisplaced fracture of the right foot 1st and 2nd metatarsal shafts and was placed in a posterior splint/ Pt was then transitioned into a walking CAM boot. Pt was seen Dr. Porter's office with complaints of not being able to move his right foot toes, having loss of sensation to his right foot and having pain with passive flexion of his right foot toes. Pt denies any pedal complaints to his left foot. Pt denies any f/c/n/v/sob/headache/chest pain/abdominal pain.      11/10: 44 y/o male was seen at bedside with attending Dr. Fareed Porter present s/p right foot emergent fasciotomy for the treatment of compartment syndrome in the PM of 11/9/18. Hospital nursing assistant is bedside and provides Iraqi translation. Pt is resting comfortably in bed and in NAD. Pt states he has some pain at the bottom of his right foot. Patient states the post-op dressing has remained clean, dry, and intact overnight. Pt denies any f/c/n/v/sob/headache/chest pain/abdominal pain.    PAST MEDICAL & SURGICAL HISTORY:  HLD (hyperlipidemia)  No significant past surgical history    Allergies  No Known Allergies    Review of Systems: Patient denies f/n/v/c/sob/ab pain at this time.     PHYSICAL EXAM:    Constitutional: NAD, awake and alert    Extremities:   Upon removing dressing to right foot, surgical site was evaluated. Mild amount of sanginous drainage expelled from open surgical site. No malodor present. No purulence expressed.   Vascular- DP and PT pulses are palpable B/L. CFT 3sec x10. TG: WNL. +Edema noted of the right foot.  Neuro- Light touch sensation is intact B/L.  Derm- Edema noted of the right foot. No erythema. No open open lesions. No acute signs of infection.  Ortho- Pain with palpation at the plantar aspect of the right foot. Negative Kirk/Homans signs      RADIOLOGY/EKG:  All previous imaging reviewed.    LABS: All Labs Reviewed:                        16.6   5.85  )-----------( 240      ( 09 Nov 2018 11:30 )             46.8     11-10    142  |  106  |  15  ----------------------------<  97  3.5   |  27  |  0.83    Ca    8.2<L>      10 Nov 2018 06:00    TPro  7.5  /  Alb  3.9  /  TBili  0.8  /  DBili  x   /  AST  30  /  ALT  29  /  AlkPhos  79  11-09    PT/INR - ( 09 Nov 2018 11:30 )   PT: 11.3 sec;   INR: 1.02 ratio         PTT - ( 09 Nov 2018 11:30 )  PTT:29.1 sec  CARDIAC MARKERS ( 09 Nov 2018 11:30 )  x     / x     / 86 U/L / x     / x          Vital Signs Last 24 Hrs  T(C): 37 (10 Nov 2018 03:22), Max: 37 (10 Nov 2018 03:22)  T(F): 98.6 (10 Nov 2018 03:22), Max: 98.6 (10 Nov 2018 03:22)  HR: 72 (10 Nov 2018 05:22) (70 - 110)  BP: 105/71 (10 Nov 2018 05:22) (105/71 - 161/90)  BP(mean): --  RR: 16 (10 Nov 2018 03:22) (12 - 17)  SpO2: 98% (10 Nov 2018 03:22) (96% - 100%)    MEDICATIONS:  MEDICATIONS  (STANDING):  docusate sodium 100 milliGRAM(s) Oral daily  enoxaparin Injectable 40 milliGRAM(s) SubCutaneous every 24 hours  hydrochlorothiazide 12.5 milliGRAM(s) Oral daily  sodium chloride 0.9%. 1000 milliLiter(s) (75 mL/Hr) IV Continuous <Continuous>        I&O's Summary    09 Nov 2018 07:01  -  10 Nov 2018 07:00  --------------------------------------------------------  IN: 700 mL / OUT: 800 mL / NET: -100 mL        CAPILLARY BLOOD GLUCOSE

## 2018-11-11 RX ADMIN — ENOXAPARIN SODIUM 40 MILLIGRAM(S): 100 INJECTION SUBCUTANEOUS at 06:23

## 2018-11-11 RX ADMIN — Medication 12.5 MILLIGRAM(S): at 06:23

## 2018-11-11 RX ADMIN — Medication 100 MILLIGRAM(S): at 13:46

## 2018-11-11 RX ADMIN — OXYCODONE HYDROCHLORIDE 5 MILLIGRAM(S): 5 TABLET ORAL at 22:45

## 2018-11-11 RX ADMIN — Medication 1 TABLET(S): at 17:30

## 2018-11-11 RX ADMIN — OXYCODONE HYDROCHLORIDE 5 MILLIGRAM(S): 5 TABLET ORAL at 22:05

## 2018-11-11 RX ADMIN — Medication 1 TABLET(S): at 06:23

## 2018-11-11 NOTE — PROGRESS NOTE ADULT - ASSESSMENT
Assessment and Plan:   · Assessment		  Assessment and Plan:    1. s/p right foot decompression fascitomy for Compartment syndrome of the right foot on 11/9/18.  2. Hx of nondisplaced 1st and 2nd met fractures; right foot  3. Pain in limb; right foot  4. Difficulty in walking    Plan:  -Pt was seen and examined s/p right foot decompression fasciotomy for compression syndrome of the right foot on 11/9/18; treatment and plan discussed with attending Dr. Fareed Porter.   -Surgical dressing on the right foot was kept intact at today's visit.   -- Spoke with ID who recommends patient be on Augmentin.   -Pain management as per med, appreciated.  -Care per Medicine, appreciated.   - Plan is for Wound VAC to be applied to patient's surgical wound to facilitate wound healing process. According to Nursing administration at this moment all hospital VAC machines are still all being occupied. Will F/U with nursing administration.  - Spoke with Case management about process of getting home VAC for patient. Paperwork for home VAC request filled out. F/U with case management.  - At this point patient is stable from podiatry standpoint for DC pending clearance from medical and infectious disease standpoints. Paperwork for Home VAC should not delay possible D/C of patient if he is deemed stable from Medicine and Infectious disease standpoints.   - Surgical shoe left at patient's bedside for him to use whenever ambulating.   -Podiatry to follow in house.

## 2018-11-11 NOTE — PROGRESS NOTE ADULT - SUBJECTIVE AND OBJECTIVE BOX
Date of Service: 11/11/18    HPI:    44 y/o male seen in the  ED on 11/9 for evaluation of right foot compartment syndrome. Pt's boss is present. Pt speaks Gibraltarian and  Melissa Ervin ID#[id number] assisted via telephone. Pt was sent to the ED by Dr. Fareed Porter to rule out compartment syndrome of the right foot. Pt sustained a nondisplaced fracture of the right foot 1st and 2nd metatarsal shafts and was placed in a posterior splint/ Pt was then transitioned into a walking CAM boot. Pt was seen Dr. Porter's office with complaints of not being able to move his right foot toes, having loss of sensation to his right foot and having pain with passive flexion of his right foot toes. Pt denies any pedal complaints to his left foot. Pt denies any f/c/n/v/sob/headache/chest pain/abdominal pain.      11/10: 44 y/o male was seen at bedside with attending Dr. Fareed Porter present s/p right foot emergent fasciotomy for the treatment of compartment syndrome in the PM of 11/9/18. Hospital nursing assistant is bedside and provides [place] translation. Pt is resting comfortably in bed and in NAD. Pt states he has some pain at the bottom of his right foot. Patient states the post-op dressing has remained clean, dry, and intact overnight. Pt denies any f/c/n/v/sob/headache/chest pain/abdominal pain.    11/11: Patient seen s/p right foot emergent fasciotomy for the treatment of compartment syndrome in the PM of 11/9/18. Assistant is bedside and provides [place] translation. Pt is resting comfortably in treatment chair adjacent to treatment bed with right foot elevated on pillows in NAD. Pt states he no pain today in his right and left feet.. Patient states the post-op dressing has remained clean, dry, and intact overnight. Pt denies any f/c/n/v/sob/headache/chest pain/abdominal pain.    PAST MEDICAL & SURGICAL HISTORY:  HLD (hyperlipidemia)  No significant past surgical history    Allergies  No Known Allergies    Review of Systems: Patient denies f/n/v/c/sob/ab pain at this time.     PHYSICAL EXAM:    Constitutional: NAD, awake and alert    Extremities:   Surgical dressing to right foot was kept intact at visit. Patient able to demonstrate wiggling of toes. Toes have cft < 3 sec x 10. Negative theo and catarina's signs.       RADIOLOGY/EKG:  All previous imaging reviewed.      LABS: All Labs Reviewed:                        16.6   5.85  )-----------( 240      ( 09 Nov 2018 11:30 )             46.8     11-10    142  |  106  |  15  ----------------------------<  97  3.5   |  27  |  0.83    Ca    8.2<L>      10 Nov 2018 06:00    TPro  7.5  /  Alb  3.9  /  TBili  0.8  /  DBili  x   /  AST  30  /  ALT  29  /  AlkPhos  79  11-09    PT/INR - ( 09 Nov 2018 11:30 )   PT: 11.3 sec;   INR: 1.02 ratio         PTT - ( 09 Nov 2018 11:30 )  PTT:29.1 sec  CARDIAC MARKERS ( 09 Nov 2018 11:30 )  x     / x     / 86 U/L / x     / x        Vital Signs Last 24 Hrs  T(C): 36.7 (11 Nov 2018 05:20), Max: 37.2 (10 Nov 2018 17:22)  T(F): 98.1 (11 Nov 2018 05:20), Max: 99 (10 Nov 2018 17:22)  HR: 63 (11 Nov 2018 05:20) (63 - 71)  BP: 100/63 (11 Nov 2018 05:20) (100/63 - 112/76)  BP(mean): --  RR: 16 (11 Nov 2018 05:20) (16 - 16)  SpO2: 99% (11 Nov 2018 05:20) (95% - 99%)    MEDICATIONS:  MEDICATIONS  (STANDING):  amoxicillin  875 milliGRAM(s)/clavulanate 1 Tablet(s) Oral two times a day  docusate sodium 100 milliGRAM(s) Oral daily  enoxaparin Injectable 40 milliGRAM(s) SubCutaneous every 24 hours  hydrochlorothiazide 12.5 milliGRAM(s) Oral daily  sodium chloride 0.9%. 1000 milliLiter(s) (75 mL/Hr) IV Continuous <Continuous>        I&O's Summary    10 Nov 2018 07:01  -  11 Nov 2018 07:00  --------------------------------------------------------  IN: 950 mL / OUT: 0 mL / NET: 950 mL

## 2018-11-11 NOTE — PROGRESS NOTE ADULT - SUBJECTIVE AND OBJECTIVE BOX
Patient is a 43y old  Male who presents with a chief complaint of compartment syndrome of the foot (10 Nov 2018 13:34)    Date of service: 11-11-18 @ 10:57      Patient sitting in chair      ROS: no fever or chills; denies dizziness, no HA, no SOB or cough, no abdominal pain, no diarrhea or constipation; no dysuria, no urinary frequency, no legs pain, no rashes    MEDICATIONS  (STANDING):  amoxicillin  875 milliGRAM(s)/clavulanate 1 Tablet(s) Oral two times a day  docusate sodium 100 milliGRAM(s) Oral daily  enoxaparin Injectable 40 milliGRAM(s) SubCutaneous every 24 hours  hydrochlorothiazide 12.5 milliGRAM(s) Oral daily  sodium chloride 0.9%. 1000 milliLiter(s) (75 mL/Hr) IV Continuous <Continuous>    MEDICATIONS  (PRN):  acetaminophen   Tablet .. 650 milliGRAM(s) Oral every 6 hours PRN Mild Pain (1 - 3)  HYDROmorphone  Injectable 0.5 milliGRAM(s) IV Push every 3 hours PRN Moderate Pain (4 - 6)  ondansetron Injectable 4 milliGRAM(s) IV Push every 6 hours PRN Nausea and/or Vomiting  oxyCODONE    IR 5 milliGRAM(s) Oral every 4 hours PRN Mild Pain (1 - 3)  oxyCODONE    IR 10 milliGRAM(s) Oral every 4 hours PRN Severe Pain (7 - 10)      Vital Signs Last 24 Hrs  T(C): 36.7 (11 Nov 2018 05:20), Max: 37.2 (10 Nov 2018 17:22)  T(F): 98.1 (11 Nov 2018 05:20), Max: 99 (10 Nov 2018 17:22)  HR: 63 (11 Nov 2018 05:20) (63 - 71)  BP: 100/63 (11 Nov 2018 05:20) (100/63 - 112/76)  BP(mean): --  RR: 16 (11 Nov 2018 05:20) (16 - 16)  SpO2: 99% (11 Nov 2018 05:20) (95% - 99%)    Physical Exam:    PE:    Constitutional: frail looking  HEENT: NC/AT, EOMI, PERRLA, conjunctivae clear; ears and nose atraumatic; pharynx clear  Neck: supple; thyroid not palpable  Back: no tenderness  Respiratory: respiratory effort normal; clear to auscultation  Cardiovascular: S1S2 regular, no murmurs  Abdomen: soft, not tender, not distended, positive BS; no liver or spleen organomegaly  Genitourinary: no suprapubic tenderness  Musculoskeletal: no muscle tenderness, right foot in surgical dressing which I could not remove, reviewed picture with podiatry, midline incision on dorsum of foot  Neurological/ Psychiatric: AxOx3, judgement and insight normal;  moving all extremities  Skin: no rashes; no palpable lesions    Labs: all available labs reviewed                       Labs:                        16.6   5.85  )-----------( 240      ( 09 Nov 2018 11:30 )             46.8     11-10    142  |  106  |  15  ----------------------------<  97  3.5   |  27  |  0.83    Ca    8.2<L>      10 Nov 2018 06:00    TPro  7.5  /  Alb  3.9  /  TBili  0.8  /  DBili  x   /  AST  30  /  ALT  29  /  AlkPhos  79  11-09           Cultures:           Radiology: all available radiological tests reviewed    Advanced directives addressed: full resuscitation

## 2018-11-11 NOTE — PROGRESS NOTE ADULT - ASSESSMENT
Pt is a 44 y/o male with PMHx of HTN on hydrochlorothiazide admitted on 11/9 for evaluation of right foot pain and swelling after being seen in podiatrist office s/p injury to foot at work 6 weeks ago, initially treated with air cast for right first and second metatarsal fractures. Upon admission found to have compartment syndrome and went to OR for fasciotomy; has surgical dressing placed post op. Never had fever, chills. Only had severe pain prior to surgery.  1. Patient is s/p fasciotomy for compartment syndrome  - wound care per podiatry  - unclear if need for antibiotics as patient underwent surgery which is treatment for such; however longer duration of compartment syndrome may put patient at risk for anaerobic organisms causing a soft tissue infection, therefore will start augmentin 875 mg po q 12 hours, for 7 days  - will continue augmentin for total 7 days; discussed with podiatry

## 2018-11-11 NOTE — PROGRESS NOTE ADULT - SUBJECTIVE AND OBJECTIVE BOX
[facility]- [facility]    CC- compartment syndrome RT foot    HPI:  Pt is a 44 y/o gentleman  with PMHx of HTN on hydrochlorothiazide who  was sent to the ED   by podiatry w/ right foot pain and swelling .  Pt was sent to the ED to r/o compartment syndrome. Pt  had a right foot fx 6 weeks ago s/p injury at work.   He had an air cast initially for R 1st and 2nd metatarsal fx.  He reports he had occas discomfort then today he had severe pain. Pt  denies f, chills, NVD, fever, numbness, tingling .    Fam Hx:  Mother alive at 62  Father alive at 65  Cousin DM (09 Nov 2018 17:56)    11/10/18- RT foot dressing dry, denies pain. No new complaints  11/11/18 no acute events, waiting for wound vac    Review of system- All 10 systems reviewed and is as per HPI otherwise negative.     Vital Signs Last 24 Hrs  T(C): 36.9 (11 Nov 2018 11:05), Max: 37.2 (10 Nov 2018 17:22)  T(F): 98.5 (11 Nov 2018 11:05), Max: 99 (10 Nov 2018 17:22)  HR: 79 (11 Nov 2018 11:05) (63 - 79)  BP: 113/75 (11 Nov 2018 11:05) (100/63 - 113/75)  BP(mean): --  RR: 16 (11 Nov 2018 11:05) (16 - 16)  SpO2: 97% (11 Nov 2018 11:05) (97% - 99%)    LABS:    10 Nov 2018 06:00    142    |  106    |  15     ----------------------------<  97     3.5     |  27     |  0.83     Ca    8.2        10 Nov 2018 06:00    RADIOLOGY & ADDITIONAL TESTS:    PHYSICAL EXAM:  GENERAL: NAD, well-groomed, well-developed  HEAD:  Atraumatic, Normocephalic  EYES: EOMI, PERRLA, conjunctiva and sclera clear  HEENT: Moist mucous membranes  NECK: Supple, No JVD  NERVOUS SYSTEM:  Alert & Oriented X3, Motor Strength 5/5 B/L upper and lower extremities; DTRs 2+ intact and symmetric  CHEST/LUNG: Clear to auscultation bilaterally; No rales, rhonchi, wheezing, or rubs  HEART: Regular rate and rhythm; No murmurs, rubs, or gallops  ABDOMEN: Soft, Nontender, Nondistended; Bowel sounds present  GENITOURINARY- Voiding, no palpable bladder  EXTREMITIES:  2+ Peripheral Pulses, No clubbing, cyanosis, or edema  MUSCULOSKELTAL- RT foot dressing dry  SKIN-no rash, no lesion  CNS- alert, oriented X3, non focal     MEDICATIONS  (STANDING):  amoxicillin  875 milliGRAM(s)/clavulanate 1 Tablet(s) Oral two times a day  docusate sodium 100 milliGRAM(s) Oral daily  enoxaparin Injectable 40 milliGRAM(s) SubCutaneous every 24 hours  hydrochlorothiazide 12.5 milliGRAM(s) Oral daily  sodium chloride 0.9%. 1000 milliLiter(s) (75 mL/Hr) IV Continuous <Continuous>    MEDICATIONS  (PRN):  acetaminophen   Tablet .. 650 milliGRAM(s) Oral every 6 hours PRN Mild Pain (1 - 3)  HYDROmorphone  Injectable 0.5 milliGRAM(s) IV Push every 3 hours PRN Moderate Pain (4 - 6)  ondansetron Injectable 4 milliGRAM(s) IV Push every 6 hours PRN Nausea and/or Vomiting  oxyCODONE    IR 5 milliGRAM(s) Oral every 4 hours PRN Mild Pain (1 - 3)  oxyCODONE    IR 10 milliGRAM(s) Oral every 4 hours PRN Severe Pain (7 - 10)    Assessment/Plan  #Compartment syndrome RT foot s/p decompression/fasciotomy  Podiatry f/u appreciated  Wound care as per podiatry  WIll need wound vac  Pain meds prn  OOB to ambulate in a surgical shoe  ID eval appreciated- on Augmentin PO    #HTN- stable    #Dispo- likely home with wound vac on Monday once all arranged

## 2018-11-12 LAB — HIV 1 & 2 AB SERPL IA.RAPID: SIGNIFICANT CHANGE UP

## 2018-11-12 RX ORDER — OXYCODONE HYDROCHLORIDE 5 MG/1
10 TABLET ORAL EVERY 4 HOURS
Qty: 0 | Refills: 0 | Status: DISCONTINUED | OUTPATIENT
Start: 2018-11-12 | End: 2018-11-14

## 2018-11-12 RX ORDER — OXYCODONE HYDROCHLORIDE 5 MG/1
5 TABLET ORAL EVERY 4 HOURS
Qty: 0 | Refills: 0 | Status: DISCONTINUED | OUTPATIENT
Start: 2018-11-12 | End: 2018-11-14

## 2018-11-12 RX ORDER — SODIUM CHLORIDE 9 MG/ML
1000 INJECTION, SOLUTION INTRAVENOUS
Qty: 0 | Refills: 0 | Status: DISCONTINUED | OUTPATIENT
Start: 2018-11-12 | End: 2018-11-12

## 2018-11-12 RX ORDER — ONDANSETRON 8 MG/1
4 TABLET, FILM COATED ORAL ONCE
Qty: 0 | Refills: 0 | Status: DISCONTINUED | OUTPATIENT
Start: 2018-11-12 | End: 2018-11-12

## 2018-11-12 RX ORDER — HYDROMORPHONE HYDROCHLORIDE 2 MG/ML
0.5 INJECTION INTRAMUSCULAR; INTRAVENOUS; SUBCUTANEOUS
Qty: 0 | Refills: 0 | Status: DISCONTINUED | OUTPATIENT
Start: 2018-11-12 | End: 2018-11-14

## 2018-11-12 RX ORDER — OXYCODONE HYDROCHLORIDE 5 MG/1
5 TABLET ORAL ONCE
Qty: 0 | Refills: 0 | Status: DISCONTINUED | OUTPATIENT
Start: 2018-11-12 | End: 2018-11-12

## 2018-11-12 RX ORDER — FENTANYL CITRATE 50 UG/ML
50 INJECTION INTRAVENOUS
Qty: 0 | Refills: 0 | Status: DISCONTINUED | OUTPATIENT
Start: 2018-11-12 | End: 2018-11-12

## 2018-11-12 RX ORDER — ACETAMINOPHEN 500 MG
1000 TABLET ORAL ONCE
Qty: 0 | Refills: 0 | Status: COMPLETED | OUTPATIENT
Start: 2018-11-12 | End: 2018-11-12

## 2018-11-12 RX ADMIN — OXYCODONE HYDROCHLORIDE 10 MILLIGRAM(S): 5 TABLET ORAL at 21:25

## 2018-11-12 RX ADMIN — OXYCODONE HYDROCHLORIDE 10 MILLIGRAM(S): 5 TABLET ORAL at 20:55

## 2018-11-12 RX ADMIN — Medication 1 TABLET(S): at 05:31

## 2018-11-12 RX ADMIN — FENTANYL CITRATE 50 MICROGRAM(S): 50 INJECTION INTRAVENOUS at 19:29

## 2018-11-12 RX ADMIN — HYDROMORPHONE HYDROCHLORIDE 0.5 MILLIGRAM(S): 2 INJECTION INTRAMUSCULAR; INTRAVENOUS; SUBCUTANEOUS at 11:38

## 2018-11-12 RX ADMIN — FENTANYL CITRATE 50 MICROGRAM(S): 50 INJECTION INTRAVENOUS at 19:23

## 2018-11-12 RX ADMIN — Medication 1 TABLET(S): at 22:25

## 2018-11-12 RX ADMIN — FENTANYL CITRATE 50 MICROGRAM(S): 50 INJECTION INTRAVENOUS at 19:30

## 2018-11-12 RX ADMIN — Medication 12.5 MILLIGRAM(S): at 05:31

## 2018-11-12 RX ADMIN — FENTANYL CITRATE 50 MICROGRAM(S): 50 INJECTION INTRAVENOUS at 20:00

## 2018-11-12 RX ADMIN — ENOXAPARIN SODIUM 40 MILLIGRAM(S): 100 INJECTION SUBCUTANEOUS at 05:32

## 2018-11-12 RX ADMIN — SODIUM CHLORIDE 75 MILLILITER(S): 9 INJECTION, SOLUTION INTRAVENOUS at 19:25

## 2018-11-12 RX ADMIN — HYDROMORPHONE HYDROCHLORIDE 0.5 MILLIGRAM(S): 2 INJECTION INTRAMUSCULAR; INTRAVENOUS; SUBCUTANEOUS at 11:22

## 2018-11-12 RX ADMIN — Medication 400 MILLIGRAM(S): at 19:25

## 2018-11-12 RX ADMIN — Medication 1000 MILLIGRAM(S): at 20:00

## 2018-11-12 NOTE — PROCEDURE NOTE - NSCOMPLICATION_GEN_A_CORE
no complications I will SWITCH the dose or number of times a day I take the medications listed below when I get home from the hospital:  None

## 2018-11-12 NOTE — PROCEDURE NOTE - GENERAL PROCEDURE DETAILS
Right ankle local block with evaluation of multiple compartments of right foot with needle from compartment pressure kit

## 2018-11-12 NOTE — PROGRESS NOTE ADULT - SUBJECTIVE AND OBJECTIVE BOX
Date of Service: 11-12-18 @ 09:35    44 y/o male seen in the  ED on 11/9 for evaluation of right foot compartment syndrome. Pt's boss is present. Pt speaks Slovenian and  Melissa Mueller ID#[id number] assisted via telephone. Pt was sent to the ED by Dr. Fareed Porter to rule out compartment syndrome of the right foot. Pt sustained a nondisplaced fracture of the right foot 1st and 2nd metatarsal shafts and was placed in a posterior splint/ Pt was then transitioned into a walking CAM boot. Pt was seen Dr. Porter's office with complaints of not being able to move his right foot toes, having loss of sensation to his right foot and having pain with passive flexion of his right foot toes. Pt denies any pedal complaints to his left foot. Pt denies any f/c/n/v/sob/headache/chest pain/abdominal pain.      11/12: Patient seen s/p right foot emergent fasciotomy for the treatment of compartment syndrome (11/9/18). Assistant is bedside and provides Slovenian translation. Pt is resting comfortably in bed and in NAD. Pt states he has mild pain in his right foot. Pt states he some numbness at the base of his right big toe; however he states he has sensation at the distal aspect of his right big toe. Pt denies any f/c/n/v/sob/headache/chest pain/abdominal pain.    PAST MEDICAL & SURGICAL HISTORY:  HLD (hyperlipidemia)  No significant past surgical history    MEDICATIONS:  MEDICATIONS  (STANDING):  amoxicillin  875 milliGRAM(s)/clavulanate 1 Tablet(s) Oral two times a day  docusate sodium 100 milliGRAM(s) Oral daily  enoxaparin Injectable 40 milliGRAM(s) SubCutaneous every 24 hours  hydrochlorothiazide 12.5 milliGRAM(s) Oral daily  sodium chloride 0.9%. 1000 milliLiter(s) (75 mL/Hr) IV Continuous <Continuous>    Allergies: NKFDA    Vital Signs Last 24 Hrs  T(C): 37.1 (11 Nov 2018 23:32), Max: 37.1 (11 Nov 2018 23:32)  T(F): 98.7 (11 Nov 2018 23:32), Max: 98.7 (11 Nov 2018 23:32)  HR: 66 (11 Nov 2018 23:32) (66 - 80)  BP: 109/66 (11 Nov 2018 23:32) (109/66 - 113/75)  BP(mean): --  RR: 16 (11 Nov 2018 23:32) (16 - 16)  SpO2: 96% (11 Nov 2018 23:32) (96% - 100%)    PHYSICAL EXAM:    Constitutional: NAD, awake and alert  Extremities:   RLE focused: Surgical dressing on the right foot is clean dry and intact. No strikethrough noted. Pt able to minimally actively move digits of the right foot. Inversion and eversion of the right foot is 5/5. PF is 4/5 and DF is 3/5. Right calf is soft and nontender. No pain upon palpation of the right calf or no right calf pain with DF.    LABS: All Labs Reviewed:  Complete Blood Count + Automated Diff (11.09.18 @ 11:30)    WBC Count: 5.85 K/uL    RBC Count: 5.13 M/uL    Hemoglobin: 16.6 g/dL    Hematocrit: 46.8 %    Mean Cell Volume: 91.2 fl    Mean Cell Hemoglobin: 32.4 pg    Mean Cell Hemoglobin Conc: 35.5 gm/dL    Red Cell Distrib Width: 11.9 %    Platelet Count - Automated: 240 K/uL    Auto Neutrophil #: 3.98 K/uL    Auto Lymphocyte #: 1.27 K/uL    Auto Monocyte #: 0.53 K/uL    Auto Eosinophil #: 0.03 K/uL    Auto Basophil #: 0.02 K/uL    Auto Neutrophil %: 68.1: Differential percentages must be correlated with absolute numbers for  clinical significance. %    Auto Lymphocyte %: 21.7 %    Auto Monocyte %: 9.1 %    Auto Eosinophil %: 0.5 %    Auto Basophil %: 0.3 %    Auto Immature Granulocyte %: 0.3 %    Basic Metabolic Panel (11.10.18 @ 06:00)    Sodium, Serum: 142 mmol/L    Potassium, Serum: 3.5 mmol/L    Chloride, Serum: 106 mmol/L    Carbon Dioxide, Serum: 27 mmol/L    Anion Gap, Serum: 9 mmol/L    Blood Urea Nitrogen, Serum: 15 mg/dL    Creatinine, Serum: 0.83 mg/dL    Glucose, Serum: 97 mg/dL    Calcium, Total Serum: 8.2 mg/dL    eGFR if Non : 106: Interpretative comment  The units for eGFR are ml/min/1.73m2 (normalized body surface area). The  eGFR is calculated from a serum creatinine using the CKD-EPI equation.  Other variables required for calculation are race, age and sex. Among  patients with chronic kidney disease (CKD), the eGFR is useful in  determining the stage of disease according to KDOQI CKD classification.  All eGFR results are reported numerically with the following  interpretation.          GFR                    With                 Without     (ml/min/1.73 m2)    Kidney Damage       Kidney Damage        >= 90                    Stage 1                     Normal        60-89                    Stage 2                     Decreased GFR        30-59     Stage 3                     Stage 3        15-29                    Stage 4                     Stage 4        < 15                      Stage 5                     Stage 5  Each stage of CKD assumes that the associated GFR level has been in  effect for at least 3 months. Determination of stages one and two (with  eGFR > 59 ml/min/m2) requires estimation of kidney damage for at least 3  months as defined by structural or functional abnormalities.  Limitations: All estimates of GFR will be less accurate for patients at  extremes of muscle mass (including but not limited to frail elderly,  critically ill, or cancer patients), those with unusual diets, and those  with conditions associated with reduced secretion or extrarenal  elimination of creatinine. The eGFR equation is not recommended for use  in patients with unstable creatinine levels. mL/min/1.73M2    eGFR if African American: 123 mL/min/1.73M2 Date of Service: 11-12-18 @ 09:35    44 y/o male seen in the  ED on 11/9 for evaluation of right foot compartment syndrome. Pt's boss is present. Pt speaks Micronesian and  Melissa Ervin ID#[id number] assisted via telephone. Pt was sent to the ED by Dr. Fareed Porter to rule out compartment syndrome of the right foot. Pt sustained a nondisplaced fracture of the right foot 1st and 2nd metatarsal shafts and was placed in a posterior splint/ Pt was then transitioned into a walking CAM boot. Pt was seen Dr. Porter's office with complaints of not being able to move his right foot toes, having loss of sensation to his right foot and having pain with passive flexion of his right foot toes. Pt denies any pedal complaints to his left foot. Pt denies any f/c/n/v/sob/headache/chest pain/abdominal pain.      11/12: Patient seen s/p right foot emergent fasciotomy for the treatment of compartment syndrome (11/9/18) with attending Dr. Fareed Porter. Assistant is bedside and provides Micronesian translation. Pt is resting comfortably in bed and in NAD. Pt states he has mild pain in his right foot. Pt states he some numbness at the base of his right big toe; however he states he has sensation at the distal aspect of his right big toe. Pt denies any f/c/n/v/sob/headache/chest pain/abdominal pain.    PAST MEDICAL & SURGICAL HISTORY:  HLD (hyperlipidemia)  No significant past surgical history    MEDICATIONS:  MEDICATIONS  (STANDING):  amoxicillin  875 milliGRAM(s)/clavulanate 1 Tablet(s) Oral two times a day  docusate sodium 100 milliGRAM(s) Oral daily  enoxaparin Injectable 40 milliGRAM(s) SubCutaneous every 24 hours  hydrochlorothiazide 12.5 milliGRAM(s) Oral daily  sodium chloride 0.9%. 1000 milliLiter(s) (75 mL/Hr) IV Continuous <Continuous>    Allergies: NKFDA    Vital Signs Last 24 Hrs  T(C): 37.1 (11 Nov 2018 23:32), Max: 37.1 (11 Nov 2018 23:32)  T(F): 98.7 (11 Nov 2018 23:32), Max: 98.7 (11 Nov 2018 23:32)  HR: 66 (11 Nov 2018 23:32) (66 - 80)  BP: 109/66 (11 Nov 2018 23:32) (109/66 - 113/75)  BP(mean): --  RR: 16 (11 Nov 2018 23:32) (16 - 16)  SpO2: 96% (11 Nov 2018 23:32) (96% - 100%)    PHYSICAL EXAM:    Constitutional: NAD, awake and alert  Extremities:   RLE focused:   V: DP and PT pulses 2/4 B/L. CFT < 3sec x10. TG: WNL  D: Surgical dressing on the right foot is clean dry and intact. No strikethrough noted. Upon removal of dressing, dorsal incision site over the 2nd met is noted to be open with healthy viable tissue. No acute signs of infection. Taut, shiny skin noted over the 3rd, 4th and 5th metatarsals with mild hyperpigmentation noted.  O: Pt able to minimally actively move digits of the right foot. Mild t pain palpated over the 3rd, 4th and 5th metatarsals of the right foot. Inversion and eversion of the right foot is 5/5. PF is 4/5 and DF is 3/5. Right calf is soft and nontender. No pain upon palpation of the right calf or no right calf pain with DF.    LABS: All Labs Reviewed:  Complete Blood Count + Automated Diff (11.09.18 @ 11:30)    WBC Count: 5.85 K/uL    RBC Count: 5.13 M/uL    Hemoglobin: 16.6 g/dL    Hematocrit: 46.8 %    Mean Cell Volume: 91.2 fl    Mean Cell Hemoglobin: 32.4 pg    Mean Cell Hemoglobin Conc: 35.5 gm/dL    Red Cell Distrib Width: 11.9 %    Platelet Count - Automated: 240 K/uL    Auto Neutrophil #: 3.98 K/uL    Auto Lymphocyte #: 1.27 K/uL    Auto Monocyte #: 0.53 K/uL    Auto Eosinophil #: 0.03 K/uL    Auto Basophil #: 0.02 K/uL    Auto Neutrophil %: 68.1: Differential percentages must be correlated with absolute numbers for  clinical significance. %    Auto Lymphocyte %: 21.7 %    Auto Monocyte %: 9.1 %    Auto Eosinophil %: 0.5 %    Auto Basophil %: 0.3 %    Auto Immature Granulocyte %: 0.3 %    Basic Metabolic Panel (11.10.18 @ 06:00)    Sodium, Serum: 142 mmol/L    Potassium, Serum: 3.5 mmol/L    Chloride, Serum: 106 mmol/L    Carbon Dioxide, Serum: 27 mmol/L    Anion Gap, Serum: 9 mmol/L    Blood Urea Nitrogen, Serum: 15 mg/dL    Creatinine, Serum: 0.83 mg/dL    Glucose, Serum: 97 mg/dL    Calcium, Total Serum: 8.2 mg/dL    eGFR if Non : 106: Interpretative comment  The units for eGFR are ml/min/1.73m2 (normalized body surface area). The  eGFR is calculated from a serum creatinine using the CKD-EPI equation.  Other variables required for calculation are race, age and sex. Among  patients with chronic kidney disease (CKD), the eGFR is useful in  determining the stage of disease according to KDOQI CKD classification.  All eGFR results are reported numerically with the following  interpretation.          GFR                    With                 Without     (ml/min/1.73 m2)    Kidney Damage       Kidney Damage        >= 90                    Stage 1                     Normal        60-89                    Stage 2                     Decreased GFR        30-59     Stage 3                     Stage 3        15-29                    Stage 4                     Stage 4        < 15                      Stage 5                     Stage 5  Each stage of CKD assumes that the associated GFR level has been in  effect for at least 3 months. Determination of stages one and two (with  eGFR > 59 ml/min/m2) requires estimation of kidney damage for at least 3  months as defined by structural or functional abnormalities.  Limitations: All estimates of GFR will be less accurate for patients at  extremes of muscle mass (including but not limited to frail elderly,  critically ill, or cancer patients), those with unusual diets, and those  with conditions associated with reduced secretion or extrarenal  elimination of creatinine. The eGFR equation is not recommended for use  in patients with unstable creatinine levels. mL/min/1.73M2    eGFR if African American: 123 mL/min/1.73M2

## 2018-11-12 NOTE — PROCEDURE NOTE - PROCEDURE
<<-----Click on this checkbox to enter Procedure Measurement of compartment pressure of lower extremity  11/12/2018  Measurment of compartment pressure of the right foot with local anesthesia  Active  PHAMPAPUR

## 2018-11-12 NOTE — BRIEF OPERATIVE NOTE - OPERATION/FINDINGS
Significant level of intra-op bleeding noted upon decompression of compartments of right foot  Taut lateral skin before case began; supple skin of the entire right foot noted upon completion of procedure.  Palpable Dorsalis pedis pulse verified at end of procedure.

## 2018-11-12 NOTE — PROCEDURE NOTE - ADDITIONAL PROCEDURE DETAILS
Pressures from right foot as follow-Lateral compartment: 34, Interosseous (4th intermetatarsal space): 44; Interosseous (3rd intermetatarsal space): 37; Interosseous (2nd intermetatarsal space): 44; Interosseous (1st intermetatarsal space): 37; Medial: 45

## 2018-11-12 NOTE — PROGRESS NOTE ADULT - ASSESSMENT
44 y/o male is seen for    1. Right foot compartment syndrome; s/p right foot decompression fasciotomy of the right foot (11/9/18).  2. Hx of nondisplaced 1st and 2nd met fractures; right foot  3. Pain in limb; right foot  4. Difficulty in walking    -Pt was seen and examined s/p right foot decompression fasciotomy for compression syndrome of the right foot on 11/9/18; treatment and plan discussed with attending Dr. Fareed Porter.   -Surgical dressing on the right foot was kept intact at today's visit. Muscle strength of the right foot assessed.  -Abx recommendations per ID, appreciated.  -Pain management as per Medicine, appreciated.  -Care per Medicine, appreciated.   -Plan is for Wound VAC to be applied to patient's surgical wound to facilitate wound healing process. According to Nursing administration at this moment all hospital VAC machines are still all being occupied. Will F/U with nursing administration.  -Spoke with Case management about process of getting home VAC for patient. Paperwork for home VAC request filled out. F/U with case management.  -At this point patient is stable from podiatry standpoint for DC pending clearance from medical and infectious disease standpoints. Paperwork for Home VAC should not delay possible D/C of patient if he is deemed stable from Medicine and Infectious disease standpoints.   -Surgical shoe left at patient's bedside for him to use whenever ambulating.   -Podiatry to follow in house. 44 y/o male is seen for    1. Right foot compartment syndrome; s/p right foot decompression fasciotomy of the right foot (11/9/18).  2. Hx of nondisplaced 1st and 2nd met fractures; right foot  3. Pain in limb; right foot  4. Difficulty in walking    -Pt was seen and examined s/p right foot decompression fasciotomy for compression syndrome of the right foot on 11/9/18; treatment and plan discussed with attending Dr. Fareed Porter.   -Surgical dressing on the right foot was kept intact at today's visit. Muscle strength of the right foot assessed.  -Compartment pressures of the right foot was reassessed with a local ankle block (1% lidocaine 17ccs). Pressures as follow-Lateral compartment: 34, Interosseous (4th intermetatarsal space): 44; Interosseous (3rd intermetatarsal space): 37; Interosseous (2nd intermetatarsal space): 44; Interosseous (1st intermetatarsal space): 37; Medial: 45  - Pt will need to go to the OR for a secondary decompression fasciotomy of the right foot today.  #[id number] was used to explain the surgical plan including the benefits, risks and alternatives. All questions answered. No guarantees made or implied about the outcome of the procedure. Conveyed to the pt that this can be a staged procedure. Pt demonstrated verbal understanding.  -Pt is NPO. Pt last ate/drank this morning at 8:30AM  -Discussed surgical plan with hospitalist Dr. Ramsey who states pt is cleared for surgical intervention.  -Abx per ID, appreciated.  -Pain management as per Medicine, appreciated.  -Care per Medicine, appreciated.   -Plan is for Wound VAC to be applied to patient's surgical wound to facilitate wound healing process.  -Spoke with Case management about process of getting home VAC for patient. Paperwork for home VAC request filled out. F/U with case management.  -Podiatry to follow in house.

## 2018-11-12 NOTE — CHART NOTE - NSCHARTNOTEFT_GEN_A_CORE
PRE-OP NOTE    Surgeon: Dr. Fareed Porter  Pre-Op Diagnosis: Right foot compartment syndrome  Planned Procedure: Right foot decompression fasciotomy   Scheduled Date: 11/12/18   Indication: 42 y/o male was initially seen in the  ED on 11/9 to rule out right foot compartment syndrome.  Pt sustained a nondisplaced fracture of the right foot 1st and 2nd metatarsal shafts and was placed in a posterior splint. Pt was then transitioned into a walking CAM boot. Pt was found to have increased compartment pressure of the intermetatarsal space 1 of 50mmHg, Pt was immediately taken to the OR was a right foot fasciotomy (11/9) where an incision was palced over the 2nd met of the right foot. The pt's right foot compartment pressures were reassessed today and noted to be elevated over 30mmHg. Pt is to go to the OR for a second decompressional fasciotomy of the right foot today.  Vitals: Vital Signs Last 24 Hrs  T(C): 36.9 (12 Nov 2018 11:18), Max: 37.1 (11 Nov 2018 23:32)  T(F): 98.4 (12 Nov 2018 11:18), Max: 98.7 (11 Nov 2018 23:32)  HR: 68 (12 Nov 2018 11:18) (66 - 80)  BP: 112/69 (12 Nov 2018 11:18) (109/66 - 112/69)  BP(mean): --  RR: 18 (12 Nov 2018 11:18) (16 - 18)  SpO2: 98% (12 Nov 2018 11:18) (96% - 100%)  PE:   Constitutional: NAD, awake and alert  Extremities:   RLE focused:   V: DP and PT pulses 2/4 B/L. CFT < 3sec x10. TG: WNL  D: Surgical dressing on the right foot is clean dry and intact. No strikethrough noted. Upon removal of dressing, dorsal incision site over the 2nd met is noted to be open with healthy viable tissue. No acute signs of infection. Taut, shiny skin noted over the 3rd, 4th and 5th metatarsals with mild hyperpigmentation noted.  O: Pt able to minimally actively move digits of the right foot. Mild t pain palpated over the 3rd, 4th and 5th metatarsals of the right foot. Inversion and eversion of the right foot is 5/5. PF is 4/5 and DF is 3/5. Right calf is soft and nontender. No pain upon palpation of the right calf or no right calf pain with DF.  Labs:   Complete Blood Count + Automated Diff (11.09.18 @ 11:30)    WBC Count: 5.85 K/uL    RBC Count: 5.13 M/uL    Hemoglobin: 16.6 g/dL    Hematocrit: 46.8 %    Mean Cell Volume: 91.2 fl    Mean Cell Hemoglobin: 32.4 pg    Mean Cell Hemoglobin Conc: 35.5 gm/dL    Red Cell Distrib Width: 11.9 %    Platelet Count - Automated: 240 K/uL    Auto Neutrophil #: 3.98 K/uL    Auto Lymphocyte #: 1.27 K/uL    Auto Monocyte #: 0.53 K/uL    Auto Eosinophil #: 0.03 K/uL    Auto Basophil #: 0.02 K/uL    Auto Neutrophil %: 68.1: Differential percentages must be correlated with absolute numbers for  clinical significance. %    Auto Lymphocyte %: 21.7 %    Auto Monocyte %: 9.1 %    Auto Eosinophil %: 0.5 %    Auto Basophil %: 0.3 %    Auto Immature Granulocyte %: 0.3 %    Basic Metabolic Panel (11.10.18 @ 06:00)    Sodium, Serum: 142 mmol/L    Potassium, Serum: 3.5 mmol/L    Chloride, Serum: 106 mmol/L    Carbon Dioxide, Serum: 27 mmol/L    Anion Gap, Serum: 9 mmol/L    Blood Urea Nitrogen, Serum: 15 mg/dL    Creatinine, Serum: 0.83 mg/dL    Glucose, Serum: 97 mg/dL    Calcium, Total Serum: 8.2 mg/dL    eGFR if Non : 106: Interpretative comment  The units for eGFR are ml/min/1.73m2 (normalized body surface area). The  eGFR is calculated from a serum creatinine using the CKD-EPI equation.  Other variables required for calculation are race, age and sex. Among  patients with chronic kidney disease (CKD), the eGFR is useful in  determining the stage of disease according to KDOQI CKD classification.  All eGFR results are reported numerically with the following  interpretation.          GFR                    With                 Without     (ml/min/1.73 m2)    Kidney Damage       Kidney Damage        >= 90                    Stage 1                     Normal        60-89                    Stage 2                     Decreased GFR        30-59     Stage 3                     Stage 3        15-29                    Stage 4                     Stage 4        < 15                      Stage 5                     Stage 5  Each stage of CKD assumes that the associated GFR level has been in  effect for at least 3 months. Determination of stages one and two (with  eGFR > 59 ml/min/m2) requires estimation of kidney damage for at least 3  months as defined by structural or functional abnormalities.  Limitations: All estimates of GFR will be less accurate for patients at  extremes of muscle mass (including but not limited to frail elderly,  critically ill, or cancer patients), those with unusual diets, and those  with conditions associated with reduced secretion or extrarenal  elimination of creatinine. The eGFR equation is not recommended for use  in patients with unstable creatinine levels. mL/min/1.73M2    eGFR if African American: 123 mL/min/1.73M2  EKG: Performed  NPO order placed 11/12; pt last ate at 8:30AM on 11/12  Medical Clearance: Discussed surgical plan with hospitalist Dr. Ramsey who stated pt medically stable for surgical intervention of the right foot.  Operative Consent: To be reviewed and signed prior to surgery  Pt and plan discussed with attending Dr. Fareed Porter. Pt scheduled for podiatric surgical intervention on 11/12/18 for right foot compartment syndrome. Benefits, risks, alternatives discussed with patient in layman’s terms. All questions answered. No guarantees made or implied about the outcome of the procedure. This is a staged procedure. This is a limb salvage procedure. No contraindications to surgery at this time.

## 2018-11-12 NOTE — PROGRESS NOTE ADULT - SUBJECTIVE AND OBJECTIVE BOX
PCP- [facility]    CC- compartment syndrome RT foot    HPI:  Pt is a 42 y/o gentleman  with PMHx of HTN on hydrochlorothiazide who  was sent to the ED   by podiatry w/ right foot pain and swelling .  Pt was sent to the ED to r/o compartment syndrome. Pt  had a right foot fx 6 weeks ago s/p injury at work.   He had an air cast initially for R 1st and 2nd metatarsal fx.  He reports he had occas discomfort then today he had severe pain. Pt  denies f, chills, NVD, fever, numbness, tingling .    Fam Hx:  Mother alive at 62  Father alive at 65  Cousin DM (09 Nov 2018 17:56)    11/10/18- RT foot dressing dry, denies pain. No new complaints  11/11/18 no acute events, waiting for wound vac  11/12/18 spoke to podiatry- needs to go back to OR    Review of system- All 10 systems reviewed and is as per HPI otherwise negative.     Vital Signs Last 24 Hrs  T(C): 36.9 (12 Nov 2018 11:18), Max: 37.1 (11 Nov 2018 23:32)  T(F): 98.4 (12 Nov 2018 11:18), Max: 98.7 (11 Nov 2018 23:32)  HR: 68 (12 Nov 2018 11:18) (66 - 80)  BP: 112/69 (12 Nov 2018 11:18) (109/66 - 112/69)  BP(mean): --  RR: 18 (12 Nov 2018 11:18) (16 - 18)  SpO2: 98% (12 Nov 2018 11:18) (96% - 100%)    LABS:    10 Nov 2018 06:00    142    |  106    |  15     ----------------------------<  97     3.5     |  27     |  0.83     Ca    8.2        10 Nov 2018 06:00    RADIOLOGY & ADDITIONAL TESTS:    PHYSICAL EXAM:  GENERAL: NAD, well-groomed, well-developed  HEAD:  Atraumatic, Normocephalic  EYES: EOMI, PERRLA, conjunctiva and sclera clear  HEENT: Moist mucous membranes  NECK: Supple, No JVD  NERVOUS SYSTEM:  Alert & Oriented X3, Motor Strength 5/5 B/L upper and lower extremities; DTRs 2+ intact and symmetric  CHEST/LUNG: Clear to auscultation bilaterally; No rales, rhonchi, wheezing, or rubs  HEART: Regular rate and rhythm; No murmurs, rubs, or gallops  ABDOMEN: Soft, Nontender, Nondistended; Bowel sounds present  GENITOURINARY- Voiding, no palpable bladder  EXTREMITIES:  2+ Peripheral Pulses, No clubbing, cyanosis, or edema  MUSCULOSKELTAL- RT foot dressing dry  SKIN-no rash, no lesion  CNS- alert, oriented X3, non focal     MEDICATIONS  (STANDING):  amoxicillin  875 milliGRAM(s)/clavulanate 1 Tablet(s) Oral two times a day  docusate sodium 100 milliGRAM(s) Oral daily  enoxaparin Injectable 40 milliGRAM(s) SubCutaneous every 24 hours  hydrochlorothiazide 12.5 milliGRAM(s) Oral daily  sodium chloride 0.9%. 1000 milliLiter(s) (75 mL/Hr) IV Continuous <Continuous>    MEDICATIONS  (PRN):  acetaminophen   Tablet .. 650 milliGRAM(s) Oral every 6 hours PRN Mild Pain (1 - 3)  HYDROmorphone  Injectable 0.5 milliGRAM(s) IV Push every 3 hours PRN Moderate Pain (4 - 6)  ondansetron Injectable 4 milliGRAM(s) IV Push every 6 hours PRN Nausea and/or Vomiting  oxyCODONE    IR 5 milliGRAM(s) Oral every 4 hours PRN Mild Pain (1 - 3)  oxyCODONE    IR 10 milliGRAM(s) Oral every 4 hours PRN Severe Pain (7 - 10)    Assessment/Plan  #Compartment syndrome RT foot s/p decompression/fasciotomy  Podiatry f/u appreciated  Patient needs to go back to OR today for more decompression  Cont AUgmentin periop  D/w podiatry- wounds look clean at present  Patient is medically stable for OR    #HTN- stable    #Dispo- pt is medically stable to go back to OR. Will follow with podiatry

## 2018-11-13 LAB
ANION GAP SERPL CALC-SCNC: 8 MMOL/L — SIGNIFICANT CHANGE UP (ref 5–17)
BUN SERPL-MCNC: 15 MG/DL — SIGNIFICANT CHANGE UP (ref 7–23)
CALCIUM SERPL-MCNC: 8.2 MG/DL — LOW (ref 8.5–10.1)
CHLORIDE SERPL-SCNC: 104 MMOL/L — SIGNIFICANT CHANGE UP (ref 96–108)
CO2 SERPL-SCNC: 28 MMOL/L — SIGNIFICANT CHANGE UP (ref 22–31)
CREAT SERPL-MCNC: 0.83 MG/DL — SIGNIFICANT CHANGE UP (ref 0.5–1.3)
GLUCOSE SERPL-MCNC: 97 MG/DL — SIGNIFICANT CHANGE UP (ref 70–99)
HAV IGM SER-ACNC: SIGNIFICANT CHANGE UP
HBV CORE IGM SER-ACNC: SIGNIFICANT CHANGE UP
HBV SURFACE AG SER-ACNC: SIGNIFICANT CHANGE UP
HCT VFR BLD CALC: 39.4 % — SIGNIFICANT CHANGE UP (ref 39–50)
HCV AB S/CO SERPL IA: 0.09 S/CO — SIGNIFICANT CHANGE UP
HCV AB SERPL-IMP: SIGNIFICANT CHANGE UP
HGB BLD-MCNC: 14 G/DL — SIGNIFICANT CHANGE UP (ref 13–17)
HIV 1+2 AB+HIV1 P24 AG SERPL QL IA: SIGNIFICANT CHANGE UP
MCHC RBC-ENTMCNC: 32.4 PG — SIGNIFICANT CHANGE UP (ref 27–34)
MCHC RBC-ENTMCNC: 35.5 GM/DL — SIGNIFICANT CHANGE UP (ref 32–36)
MCV RBC AUTO: 91.2 FL — SIGNIFICANT CHANGE UP (ref 80–100)
NRBC # BLD: 0 /100 WBCS — SIGNIFICANT CHANGE UP (ref 0–0)
PLATELET # BLD AUTO: 215 K/UL — SIGNIFICANT CHANGE UP (ref 150–400)
POTASSIUM SERPL-MCNC: 3.5 MMOL/L — SIGNIFICANT CHANGE UP (ref 3.5–5.3)
POTASSIUM SERPL-SCNC: 3.5 MMOL/L — SIGNIFICANT CHANGE UP (ref 3.5–5.3)
RBC # BLD: 4.32 M/UL — SIGNIFICANT CHANGE UP (ref 4.2–5.8)
RBC # FLD: 11.7 % — SIGNIFICANT CHANGE UP (ref 10.3–14.5)
SODIUM SERPL-SCNC: 140 MMOL/L — SIGNIFICANT CHANGE UP (ref 135–145)
WBC # BLD: 7.27 K/UL — SIGNIFICANT CHANGE UP (ref 3.8–10.5)
WBC # FLD AUTO: 7.27 K/UL — SIGNIFICANT CHANGE UP (ref 3.8–10.5)

## 2018-11-13 RX ORDER — HYDROMORPHONE HYDROCHLORIDE 2 MG/ML
0.5 INJECTION INTRAMUSCULAR; INTRAVENOUS; SUBCUTANEOUS ONCE
Qty: 0 | Refills: 0 | Status: DISCONTINUED | OUTPATIENT
Start: 2018-11-13 | End: 2018-11-13

## 2018-11-13 RX ADMIN — OXYCODONE HYDROCHLORIDE 10 MILLIGRAM(S): 5 TABLET ORAL at 19:04

## 2018-11-13 RX ADMIN — Medication 12.5 MILLIGRAM(S): at 06:42

## 2018-11-13 RX ADMIN — HYDROMORPHONE HYDROCHLORIDE 0.5 MILLIGRAM(S): 2 INJECTION INTRAMUSCULAR; INTRAVENOUS; SUBCUTANEOUS at 22:04

## 2018-11-13 RX ADMIN — OXYCODONE HYDROCHLORIDE 10 MILLIGRAM(S): 5 TABLET ORAL at 19:36

## 2018-11-13 RX ADMIN — HYDROMORPHONE HYDROCHLORIDE 0.5 MILLIGRAM(S): 2 INJECTION INTRAMUSCULAR; INTRAVENOUS; SUBCUTANEOUS at 09:00

## 2018-11-13 RX ADMIN — HYDROMORPHONE HYDROCHLORIDE 0.5 MILLIGRAM(S): 2 INJECTION INTRAMUSCULAR; INTRAVENOUS; SUBCUTANEOUS at 05:21

## 2018-11-13 RX ADMIN — HYDROMORPHONE HYDROCHLORIDE 0.5 MILLIGRAM(S): 2 INJECTION INTRAMUSCULAR; INTRAVENOUS; SUBCUTANEOUS at 04:11

## 2018-11-13 RX ADMIN — OXYCODONE HYDROCHLORIDE 10 MILLIGRAM(S): 5 TABLET ORAL at 02:45

## 2018-11-13 RX ADMIN — OXYCODONE HYDROCHLORIDE 10 MILLIGRAM(S): 5 TABLET ORAL at 23:46

## 2018-11-13 RX ADMIN — OXYCODONE HYDROCHLORIDE 10 MILLIGRAM(S): 5 TABLET ORAL at 11:48

## 2018-11-13 RX ADMIN — Medication 100 MILLIGRAM(S): at 11:50

## 2018-11-13 RX ADMIN — ENOXAPARIN SODIUM 40 MILLIGRAM(S): 100 INJECTION SUBCUTANEOUS at 05:21

## 2018-11-13 RX ADMIN — OXYCODONE HYDROCHLORIDE 10 MILLIGRAM(S): 5 TABLET ORAL at 06:41

## 2018-11-13 RX ADMIN — HYDROMORPHONE HYDROCHLORIDE 0.5 MILLIGRAM(S): 2 INJECTION INTRAMUSCULAR; INTRAVENOUS; SUBCUTANEOUS at 21:49

## 2018-11-13 RX ADMIN — HYDROMORPHONE HYDROCHLORIDE 0.5 MILLIGRAM(S): 2 INJECTION INTRAMUSCULAR; INTRAVENOUS; SUBCUTANEOUS at 15:10

## 2018-11-13 RX ADMIN — Medication 1 TABLET(S): at 19:00

## 2018-11-13 RX ADMIN — Medication 1 TABLET(S): at 06:42

## 2018-11-13 RX ADMIN — HYDROMORPHONE HYDROCHLORIDE 0.5 MILLIGRAM(S): 2 INJECTION INTRAMUSCULAR; INTRAVENOUS; SUBCUTANEOUS at 05:35

## 2018-11-13 RX ADMIN — OXYCODONE HYDROCHLORIDE 10 MILLIGRAM(S): 5 TABLET ORAL at 02:15

## 2018-11-13 RX ADMIN — HYDROMORPHONE HYDROCHLORIDE 0.5 MILLIGRAM(S): 2 INJECTION INTRAMUSCULAR; INTRAVENOUS; SUBCUTANEOUS at 03:41

## 2018-11-13 NOTE — PROGRESS NOTE ADULT - ASSESSMENT
Assessment: 42 y/o male is seen for    1. Right foot compartment syndrome; s/p right foot decompression fasciotomy of the right foot (11/9/18, 11/13/18).  2. Hx of nondisplaced 1st and 2nd met fractures; right foot  3. Pain in limb; right foot  4. Difficulty in walking    Plan:  -Pt was seen and examined; treatment and plan discussed with attending Dr. Fareed Porter.   -Labs and chart reviewed.   - Wounds irrigated with saline  - White foam applied over the dorsal wounds. Silver foam applied over the incisions on Rt foot.  - Wound vac dressing applied. Home vac set up to run at bedside at 125mmHg continuous without leaks and clots.   - keep wound vac and dressing intact on the Rt foot. If there are leaks and clots, please contact podiatry.  - Wound vac will be changed q48-72h.   - Pt should be discharged home with wound vac and home care.  - Appreciate ID consult. ABx as per ID.  - Care as per medicine, appreciated.  - Podiatry will monitor.

## 2018-11-13 NOTE — PROGRESS NOTE ADULT - SUBJECTIVE AND OBJECTIVE BOX
[facility]    CC- compartment syndrome RT foot    HPI:  Pt is a 42 y/o gentleman  with PMHx of HTN on hydrochlorothiazide who  was sent to the ED   by podiatry w/ right foot pain and swelling .  Pt was sent to the ED to r/o compartment syndrome. Pt  had a right foot fx 6 weeks ago s/p injury at work.   He had an air cast initially for R 1st and 2nd metatarsal fx.  He reports he had occas discomfort then today he had severe pain. Pt  denies f, chills, NVD, fever, numbness, tingling .    Fam Hx:  Mother alive at 62  Father alive at 65  Cousin DM (09 Nov 2018 17:56)    11/10/18- RT foot dressing dry, denies pain. No new complaints  11/11/18 no acute events, waiting for wound vac  11/12/18 spoke to podiatry- needs to go back to OR  11/13/18 s/p OR yesterday, in a lot of pain today    Review of system- All 10 systems reviewed and is as per HPI otherwise negative.     Vital Signs Last 24 Hrs  T(C): 37.8 (13 Nov 2018 11:31), Max: 37.8 (12 Nov 2018 20:00)  T(F): 100.1 (13 Nov 2018 11:31), Max: 100.1 (13 Nov 2018 11:31)  HR: 100 (13 Nov 2018 15:12) (75 - 100)  BP: 156/88 (13 Nov 2018 15:12) (121/69 - 156/88)  BP(mean): --  RR: 20 (13 Nov 2018 15:12) (13 - 20)  SpO2: 95% (13 Nov 2018 15:12) (95% - 99%)    LABS:                     14.0   7.27  )-----------( 215      ( 13 Nov 2018 06:26 )             39.4     13 Nov 2018 06:26    140    |  104    |  15     ----------------------------<  97     3.5     |  28     |  0.83     Ca    8.2        13 Nov 2018 06:26    RADIOLOGY & ADDITIONAL TESTS:    PHYSICAL EXAM:  GENERAL: NAD, well-groomed, well-developed  HEAD:  Atraumatic, Normocephalic  EYES: EOMI, PERRLA, conjunctiva and sclera clear  HEENT: Moist mucous membranes  NECK: Supple, No JVD  NERVOUS SYSTEM:  Alert & Oriented X3, Motor Strength 5/5 B/L upper and lower extremities; DTRs 2+ intact and symmetric  CHEST/LUNG: Clear to auscultation bilaterally; No rales, rhonchi, wheezing, or rubs  HEART: Regular rate and rhythm; No murmurs, rubs, or gallops  ABDOMEN: Soft, Nontender, Nondistended; Bowel sounds present  GENITOURINARY- Voiding, no palpable bladder  EXTREMITIES:  2+ Peripheral Pulses, No clubbing, cyanosis, or edema  MUSCULOSKELTAL- RT foot dressing dry  SKIN-no rash, no lesion  CNS- alert, oriented X3, non focal     MEDICATIONS  (STANDING):  amoxicillin  875 milliGRAM(s)/clavulanate 1 Tablet(s) Oral two times a day  docusate sodium 100 milliGRAM(s) Oral daily  enoxaparin Injectable 40 milliGRAM(s) SubCutaneous every 24 hours  hydrochlorothiazide 12.5 milliGRAM(s) Oral daily  sodium chloride 0.9%. 1000 milliLiter(s) (75 mL/Hr) IV Continuous <Continuous>    MEDICATIONS  (PRN):  acetaminophen   Tablet .. 650 milliGRAM(s) Oral every 6 hours PRN Mild Pain (1 - 3)  HYDROmorphone  Injectable 0.5 milliGRAM(s) IV Push every 3 hours PRN Moderate Pain (4 - 6)  HYDROmorphone  Injectable 0.5 milliGRAM(s) IV Push every 3 hours PRN Severe Pain (7 - 10)  ondansetron Injectable 4 milliGRAM(s) IV Push every 6 hours PRN Nausea and/or Vomiting  oxyCODONE    IR 5 milliGRAM(s) Oral every 4 hours PRN Mild Pain (1 - 3)  oxyCODONE    IR 10 milliGRAM(s) Oral every 4 hours PRN Moderate Pain (4 - 6)  oxyCODONE    IR 5 milliGRAM(s) Oral every 4 hours PRN Mild Pain (1 - 3)  oxyCODONE    IR 10 milliGRAM(s) Oral every 4 hours PRN Severe Pain (7 - 10)    Assessment/Plan  #Compartment syndrome RT foot s/p decompression/fasciotomy  Podiatry f/u appreciated  S/p second surgery yesterday  Cont pain meds  Cont Augmentin  DVT proph by Lovenox SQ  Wound vac at bedside for podiatry to apply    #HTN- stable    #Dispo- once pain is controlled and wound vac applied, likely home tomorrow. D/w pt

## 2018-11-13 NOTE — PROGRESS NOTE ADULT - SUBJECTIVE AND OBJECTIVE BOX
Date of Service: 11-13-18     44 y/o male seen in the  ED on 11/9 for evaluation of right foot compartment syndrome. Pt's boss is present. Pt speaks Azeri and  Melissa Ervin ID#[id number] assisted via telephone. Pt was sent to the ED by Dr. Fareed Porter to rule out compartment syndrome of the right foot. Pt sustained a nondisplaced fracture of the right foot 1st and 2nd metatarsal shafts and was placed in a posterior splint/ Pt was then transitioned into a walking CAM boot. Pt was seen Dr. Porter's office with complaints of not being able to move his right foot toes, having loss of sensation to his right foot and having pain with passive flexion of his right foot toes. Pt denies any pedal complaints to his left foot. Pt denies any f/c/n/v/sob/headache/chest pain/abdominal pain.      11/12: Rt foot fasciotomy    11/13: Pt was seen bedside for s/p Rt foot fasciotomy POD 1. A  was used during the encounter. Pt was resting in bed with Right foot elevated on 2 pillows. Pt complains of pain, but it is better with pain medication. He complains of throbbing pain in his Rt foot. He denies any f/n/v/c/sob.       PAST MEDICAL & SURGICAL HISTORY:  HLD (hyperlipidemia)  No significant past surgical history    MEDICATIONS:  MEDICATIONS  (STANDING):  amoxicillin  875 milliGRAM(s)/clavulanate 1 Tablet(s) Oral two times a day  docusate sodium 100 milliGRAM(s) Oral daily  enoxaparin Injectable 40 milliGRAM(s) SubCutaneous every 24 hours  hydrochlorothiazide 12.5 milliGRAM(s) Oral daily  sodium chloride 0.9%. 1000 milliLiter(s) (75 mL/Hr) IV Continuous <Continuous>    Allergies: NKFDA    Vital Signs Last 24 Hrs  T(C): 37.8 (13 Nov 2018 11:31), Max: 37.8 (12 Nov 2018 20:00)  T(F): 100.1 (13 Nov 2018 11:31), Max: 100.1 (13 Nov 2018 11:31)  HR: 100 (13 Nov 2018 15:12) (75 - 100)  BP: 156/88 (13 Nov 2018 15:12) (121/69 - 156/88)  BP(mean): --  RR: 20 (13 Nov 2018 15:12) (13 - 20)  SpO2: 95% (13 Nov 2018 15:12) (95% - 99%)      PHYSICAL EXAM:    Constitutional: NAD, awake and alert    Extremities:   RLE focused:     Vasc: DP and PT pulses 2/4 B/L. CFT < 3sec x10. TG: WNL    Derm: 2 linear incisions noted over the dorsum of the Rt foot and 1 linear incision on the medial side of the Right foot. Wound is beefy red and granular. No active bleeding is noted. No pus is noted. No bone is exposed. No IDM. Edema is noted on the Right foot. No erythema. No acute SOI.     Neuro: Diminished epicritic sensation on the Rt foot digits 1-5. [place] Florencio test 3/10 on Right foot. Tolerates light touch without pain.     Ortho: Limited ROM due to guarding. Limited extension and flexion of digits without pain.       Labs:     Complete Blood Count in AM (11.13.18 @ 06:26)    Nucleated RBC: 0 /100 WBCs    WBC Count: 7.27 K/uL    RBC Count: 4.32 M/uL    Hemoglobin: 14.0 g/dL    Hematocrit: 39.4 %    Mean Cell Volume: 91.2 fl    Mean Cell Hemoglobin: 32.4 pg    Mean Cell Hemoglobin Conc: 35.5 gm/dL    Red Cell Distrib Width: 11.7 %    Platelet Count - Automated: 215 K/uL    Basic Metabolic Panel in AM (11.13.18 @ 06:26)    Sodium, Serum: 140 mmol/L    Potassium, Serum: 3.5 mmol/L    Chloride, Serum: 104 mmol/L    Carbon Dioxide, Serum: 28 mmol/L    Anion Gap, Serum: 8 mmol/L    Blood Urea Nitrogen, Serum: 15 mg/dL    Creatinine, Serum: 0.83 mg/dL    Glucose, Serum: 97 mg/dL    Calcium, Total Serum: 8.2 mg/dL    eGFR if Non : 106: Interpretative comment  The units for eGFR are ml/min/1.73m2 (normalized body surface area). The  eGFR is calculated from a serum creatinine using the CKD-EPI equation.  Other variables required for calculation are race, age and sex. Among  patients with chronic kidney disease (CKD), the eGFR is useful in  determining the stage of disease according to KDOQI CKD classification.  All eGFR results are reported numerically with the following  interpretation.          GFR                    With                 Without     (ml/min/1.73 m2)    Kidney Damage       Kidney Damage        >= 90                    Stage 1                     Normal        60-89                    Stage 2                     Decreased GFR        30-59     Stage 3                     Stage 3        15-29                    Stage 4                     Stage 4        < 15                      Stage 5                     Stage 5  Each stage of CKD assumes that the associated GFR level has been in  effect for at least 3 months. Determination of stages one and two (with  eGFR > 59 ml/min/m2) requires estimation of kidney damage for at least 3  months as defined by structural or functional abnormalities.  Limitations: All estimates of GFR will be less accurate for patients at  extremes of muscle mass (including but not limited to frail elderly,  critically ill, or cancer patients), those with unusual diets, and those  with conditions associated with reduced secretion or extrarenal  elimination of creatinine. The eGFR equation is not recommended for use  in patients with unstable creatinine levels. mL/min/1.73M2    eGFR if African American: 123 mL/min/1.73M2      Radiology:    EXAM:  XR FOOT-RIGHT-2 VIEWS                            PROCEDURE DATE:  10/02/2018          INTERPRETATION:  History: Stone fell on right foot, hematoma.  2 views right foot.    There is acute oblique nondisplaced fracture the mid and distal shaftof   the first metatarsal. There is complete acute nondisplaced fracture mid   to distal shaft of the second metatarsal. No other fractures. No   dislocation. Joint spaces preserved. Marked soft tissue swelling/hematoma   over the dorsum of the foot.Small retrocalcaneal osteophyte.    Impression: Acute fractures right first and second metatarsal as   described.

## 2018-11-14 VITALS
OXYGEN SATURATION: 98 % | SYSTOLIC BLOOD PRESSURE: 119 MMHG | DIASTOLIC BLOOD PRESSURE: 86 MMHG | HEART RATE: 98 BPM | RESPIRATION RATE: 16 BRPM | TEMPERATURE: 98 F

## 2018-11-14 RX ORDER — SENNA PLUS 8.6 MG/1
2 TABLET ORAL ONCE
Qty: 0 | Refills: 0 | Status: COMPLETED | OUTPATIENT
Start: 2018-11-14 | End: 2018-11-14

## 2018-11-14 RX ORDER — DOCUSATE SODIUM 100 MG
1 CAPSULE ORAL
Qty: 0 | Refills: 0 | COMMUNITY
Start: 2018-11-14

## 2018-11-14 RX ORDER — OXYCODONE HYDROCHLORIDE 5 MG/1
1 TABLET ORAL
Qty: 42 | Refills: 0 | OUTPATIENT
Start: 2018-11-14 | End: 2018-11-20

## 2018-11-14 RX ORDER — ASPIRIN/CALCIUM CARB/MAGNESIUM 324 MG
1 TABLET ORAL
Qty: 0 | Refills: 0 | COMMUNITY

## 2018-11-14 RX ORDER — ACETAMINOPHEN 500 MG
2 TABLET ORAL
Qty: 0 | Refills: 0 | COMMUNITY
Start: 2018-11-14

## 2018-11-14 RX ADMIN — SENNA PLUS 2 TABLET(S): 8.6 TABLET ORAL at 14:02

## 2018-11-14 RX ADMIN — OXYCODONE HYDROCHLORIDE 10 MILLIGRAM(S): 5 TABLET ORAL at 17:19

## 2018-11-14 RX ADMIN — Medication 12.5 MILLIGRAM(S): at 06:03

## 2018-11-14 RX ADMIN — OXYCODONE HYDROCHLORIDE 10 MILLIGRAM(S): 5 TABLET ORAL at 00:16

## 2018-11-14 RX ADMIN — ENOXAPARIN SODIUM 40 MILLIGRAM(S): 100 INJECTION SUBCUTANEOUS at 06:03

## 2018-11-14 RX ADMIN — Medication 100 MILLIGRAM(S): at 11:40

## 2018-11-14 RX ADMIN — OXYCODONE HYDROCHLORIDE 10 MILLIGRAM(S): 5 TABLET ORAL at 06:32

## 2018-11-14 RX ADMIN — Medication 1 TABLET(S): at 06:03

## 2018-11-14 RX ADMIN — Medication 1 TABLET(S): at 17:18

## 2018-11-14 RX ADMIN — OXYCODONE HYDROCHLORIDE 10 MILLIGRAM(S): 5 TABLET ORAL at 06:02

## 2018-11-14 NOTE — PROGRESS NOTE ADULT - SUBJECTIVE AND OBJECTIVE BOX
Date of Service: 11/14/18    HPI:  44 y/o male seen in the  ED on 11/9 for evaluation of right foot compartment syndrome. Pt's boss is present. Pt speaks Anguillan and  Melissa Ervin ID#[id number] assisted via telephone. Pt was sent to the ED by Dr. Fareed Porter to rule out compartment syndrome of the right foot. Pt sustained a nondisplaced fracture of the right foot 1st and 2nd metatarsal shafts and was placed in a posterior splint/ Pt was then transitioned into a walking CAM boot. Pt was seen Dr. Porter's office with complaints of not being able to move his right foot toes, having loss of sensation to his right foot and having pain with passive flexion of his right foot toes. Pt denies any pedal complaints to his left foot. Pt denies any f/c/n/v/sob/headache/chest pain/abdominal pain.      11/12: Rt foot fasciotomy    11/13: Pt was seen bedside for s/p Rt foot fasciotomy POD 1. A  was used during the encounter. Pt was resting in bed with Right foot elevated on 2 pillows. Pt complains of pain, but it is better with pain medication. He complains of throbbing pain in his Rt foot. He denies any f/n/v/c/sob.     11/14: Patient seen bedside for s/p right foot fasciotomies POD 2. Nursing assistant provides Anguillan translation. Patient relates 3 out of 10 pain and better than on 11/13. Patient found resting with R foot on 2 pillows. Patient denies any constitutional symptoms at this time.     PAST MEDICAL & SURGICAL HISTORY:  HLD (hyperlipidemia)  No significant past surgical history    Allergies  No Known Allergies    Review of Systems: Patient denies f/n/v/c/sob/ab pain at this time.     PHYSICAL EXAM:  Extremities:   RLE focused:     Vasc: DP and PT pulses 2/4 B/L. CFT < 3sec x10. TG: WNL  R foot surgical dressing and Wound VAC left c/d/i at visit.     Derm: 2 linear incisions noted over the dorsum of the Rt foot and 1 linear incision on the medial side of the Right foot. Wound is beefy red and granular. No active bleeding is noted. No pus is noted. No bone is exposed. No IDM. Edema is noted on the Right foot. No erythema. No acute SOI.     Neuro: Diminished epicritic sensation on the Rt foot digits 1-5. [place] Florencio test 3/10 on Right foot. Tolerates light touch without pain.     Ortho: Limited ROM due to guarding. Limited extension and flexion of digits without pain.     RADIOLOGY/EKG:    EXAM:  XR FOOT-RIGHT-2 VIEWS                            PROCEDURE DATE:  10/02/2018          INTERPRETATION:  History: Stone fell on right foot, hematoma.  2 views right foot.    There is acute oblique nondisplaced fracture the mid and distal shaftof   the first metatarsal. There is complete acute nondisplaced fracture mid   to distal shaft of the second metatarsal. No other fractures. No   dislocation. Joint spaces preserved. Marked soft tissue swelling/hematoma   over the dorsum of the foot.Small retrocalcaneal osteophyte.    Impression: Acute fractures right first and second metatarsal as   described.      LABS: All Labs Reviewed:                        14.0   7.27  )-----------( 215      ( 13 Nov 2018 06:26 )             39.4     11-13    140  |  104  |  15  ----------------------------<  97  3.5   |  28  |  0.83    Ca    8.2<L>      13 Nov 2018 06:26            Blood Culture:     Vital Signs Last 24 Hrs  T(C): 37.1 (14 Nov 2018 03:18), Max: 37.9 (13 Nov 2018 17:15)  T(F): 98.8 (14 Nov 2018 03:18), Max: 100.3 (13 Nov 2018 17:15)  HR: 98 (14 Nov 2018 03:18) (90 - 115)  BP: 116/81 (14 Nov 2018 03:18) (116/81 - 156/88)  BP(mean): --  RR: 18 (14 Nov 2018 03:18) (16 - 20)  SpO2: 98% (14 Nov 2018 03:18) (94% - 99%)    MEDICATIONS:  MEDICATIONS  (STANDING):  amoxicillin  875 milliGRAM(s)/clavulanate 1 Tablet(s) Oral two times a day  docusate sodium 100 milliGRAM(s) Oral daily  enoxaparin Injectable 40 milliGRAM(s) SubCutaneous every 24 hours  hydrochlorothiazide 12.5 milliGRAM(s) Oral daily  sodium chloride 0.9%. 1000 milliLiter(s) (75 mL/Hr) IV Continuous <Continuous>        I&O's Summary    13 Nov 2018 07:01  -  14 Nov 2018 07:00  --------------------------------------------------------  IN: 150 mL / OUT: 1200 mL / NET: -1050 mL        CAPILLARY BLOOD GLUCOSE

## 2018-11-14 NOTE — DISCHARGE NOTE ADULT - PLAN OF CARE
wound vac as per podiatry Outpatient f/u at [facility] [place] and Dr Porter

## 2018-11-14 NOTE — PROGRESS NOTE ADULT - REASON FOR ADMISSION
compartment syndrome of the foot

## 2018-11-14 NOTE — DISCHARGE NOTE ADULT - MEDICATION SUMMARY - MEDICATIONS TO TAKE
I will START or STAY ON the medications listed below when I get home from the hospital:    acetaminophen 325 mg oral tablet  -- 2 tab(s) by mouth every 6 hours, As needed, Mild Pain (1 - 3)  -- Indication: For prn for pain    oxyCODONE 5 mg oral tablet  -- 1 tab(s) by mouth every 4 hours, As needed, Moderate pain MDD:6 tabs  -- Indication: For prn for pain    aspirin 325 mg oral tablet  -- 1 tab(s) by mouth 2 times a day for 2 weeks  -- Indication: For DVT proph    hydroCHLOROthiazide 12.5 mg oral capsule  -- 1 cap(s) by mouth once a day  -- Indication: For home med    docusate sodium 100 mg oral capsule  -- 1 cap(s) by mouth once a day  -- Indication: For bowel regimen-OTC    amoxicillin-clavulanate 875 mg-125 mg oral tablet  -- 1 tab(s) by mouth 2 times a day  -- Indication: For antibiotic

## 2018-11-14 NOTE — DISCHARGE NOTE ADULT - HOSPITAL COURSE
PCP- Andrea Wells- new patient  CC- compartment syndrome of the foot (14 Nov 2018 08:26)  HPI:  Pt is a 42 y/o gentleman  with PMHx of HTN on hydrochlorothiazide who  was sent to the ED   by podiatry w/ right foot pain and swelling .  Pt was sent to the ED to r/o compartment syndrome. Pt  had a right foot fx 6 weeks ago s/p injury at work.   He had an air cast initially for R 1st and 2nd metatarsal fx.  He reports he had occas discomfort then today he had severe pain. Pt  denies f, chills, NVD, fever, numbness, tingling .    Hospital stay- pt underwent decompression surgery RT foot twice, wound vac applied and cleared from podiatry stand point for discharge for outpatient f/u  Review of system- All 10 systems reviewed and is as per HPI otherwise negative.     T(C): 36.7 (11-14-18 @ 11:24), Max: 37.9 (11-13-18 @ 17:15)  HR: 98 (11-14-18 @ 11:24) (90 - 115)  BP: 112/81 (11-14-18 @ 11:24) (112/81 - 156/88)  RR: 16 (11-14-18 @ 11:24) (16 - 20)  SpO2: 97% (11-14-18 @ 11:24) (94% - 98%)  Wt(kg): --  LABS:                        14.0   7.27  )-----------( 215      ( 13 Nov 2018 06:26 )             39.4     140  |  104  |  15  ----------------------------<  97  3.5   |  28  |  0.83  Ca    8.2<L>      13 Nov 2018 06:26    PHYSICAL EXAM:  GENERAL: NAD, well-groomed, well-developed  HEAD:  Atraumatic, Normocephalic  EYES: EOMI, PERRLA, conjunctiva and sclera clear  HEENT: Moist mucous membranes  NECK: Supple, No JVD  NERVOUS SYSTEM:  Alert & Oriented X3, Motor Strength 5/5 B/L upper and lower extremities; DTRs 2+ intact and symmetric  CHEST/LUNG: Clear to auscultation bilaterally; No rales, rhonchi, wheezing, or rubs  HEART: Regular rate and rhythm; No murmurs, rubs, or gallops  ABDOMEN: Soft, Nontender, Nondistended; Bowel sounds present  GENITOURINARY- Voiding, no palpable bladder  EXTREMITIES:  2+ Peripheral Pulses, No clubbing, cyanosis, or edema  MUSCULOSKELTAL- RT foot dressing on, +wound vac applied  SKIN-no rash, no lesion  CNS- alert, oriented X3, non focal     Assessment/Plan  #RT foot compartment syndrome s/p decompression surgery twice  #Metatarsal fractures  Wound vac on. Patient for discharge home to f/u with DR Porter as outpatient. Will set up Andrea appointment as well  Scripts for abx and pain meds sent to pharmacy  #Dispo- home with home care today

## 2018-11-14 NOTE — PROGRESS NOTE ADULT - ASSESSMENT
· Assessment		  Assessment: 44 y/o male is seen for    1. Right foot compartment syndrome; s/p right foot decompression fasciotomy of the right foot (11/9/18, 11/13/18).  2. Hx of nondisplaced 1st and 2nd met fractures; right foot  3. Pain in limb; right foot  4. Difficulty in walking    Plan:  -Pt was seen and examined; treatment and plan discussed with attending Dr. Fareed Porter.   -Labs and chart reviewed.   - Wound VAC noted to be running without incidence and very minimal sanginous drainage.   - Wound VAC and dressing left unchanged, clean, dry, and intact at visit.   - White foam applied over the dorsal wounds. Silver foam applied over the incisions on Rt foot.  - keep wound vac and dressing intact on the Rt foot. If there are leaks and clots, please contact podiatry.  - Wound vac will be changed q48-72h.   - Pt should be discharged home with wound vac and home care.  - Pain control as per Medicine, appreciated.  - Appreciate ID consult. ABx as per ID.  - Care as per medicine, appreciated.  - Podiatry will follow pt while in house. · Assessment		  Assessment: 44 y/o male is seen for    1. Right foot compartment syndrome; s/p right foot decompression fasciotomy of the right foot (11/9/18, 11/13/18).  2. Hx of nondisplaced 1st and 2nd met fractures; right foot  3. Pain in limb; right foot  4. Difficulty in walking    Plan:  -Pt was seen and examined; treatment and plan discussed with attending Dr. Fareed Porter.   -Labs and chart reviewed.   - Wound VAC noted to be running without incidence and very minimal sanginous drainage.   - Wound VAC kept intact and dressing over wound VAC changed.   - Next 4x4 gauze, kerlix, ace bandage applied.   - keep wound vac and dressing intact on the Rt foot. If there are leaks and clots, please contact podiatry.  - Wound vac will be changed q48-72h.   - Pt is stable from Podiatric standpoint to be discharged if deemed stable from Medicine standpoint.  - Plan is upon discharge Pt to have Wound VAC kept intact until Friday 11/16/18. On Friday Visiting Nurse should change wound VAC. Patient to have wound VAC changed every Monday, Wednesday, Friday from then on. Instructions for application of wound VAC below.  - Patient to follow up in office of Dr. Fareed Porter on Wednesday 11/21/18.  - Recommend patient follow up in the [facility] Medicine clinic, to discuss possible neurology referral for patient.  - Pain control as per Medicine, appreciated.  - Appreciate ID consult. ABx as per ID.  - Care as per medicine, appreciated.  - Podiatry will follow pt while in house.     WOUND VAC INSTRUCTIONS FOR VISITING NURSE:  - Please turn off VAC and discontinue tubing and then remove foam and adhesive tap from patient's right foot.  - Please cut out White Foam of Wound VAC and place in the 2 dorsal incision sites of the right foot.   - Cut out silver foam and place in the medial incision of the right foot.  - Next cover White foam and place silver foam on all three incision  - Next cover all of the foam with adhesive strips.   - Next make a small cut dime size in each of the strips covering the Silver foam  - Next place Silver foam on top of the dorsum of the foot, serving as a bridge for the wound VAC.  - Next place adhesive strip over the silver foam.  - Next place a small cut dime size in the adhesive strip covering the silver bridge.  - Next place the Jaqui pad tube directly over the cut in the adhesive strip.   - Please connect the tubing and have the VAC on 125 mmHg continuous low intensity.   - Place 4x4 gauze, Kerlix, and ACE bandage to right foot. Make sure tubing to wound VAC is pointing upwards up the leg.

## 2018-11-14 NOTE — DISCHARGE NOTE ADULT - CARE PLAN
Principal Discharge DX:	Compartment syndrome  Goal:	wound vac as per podiatry  Assessment and plan of treatment:	Outpatient f/u at [facility][note 3] and Dr Porter

## 2018-11-14 NOTE — PROGRESS NOTE ADULT - PROVIDER SPECIALTY LIST ADULT
Hospitalist
Infectious Disease
Podiatry
Hospitalist

## 2018-11-14 NOTE — DISCHARGE NOTE ADULT - CARE PROVIDER_API CALL
Fareed Porter (BUTCH), Orthopaedics Surgery  [address] [place] [address]  Phone: [phone]  Fax: [phone]    Co, Ismael PRICE), Internal Medicine  [address] [place] [address]  Phone: [phone]  Fax: [phone]

## 2018-11-14 NOTE — DISCHARGE NOTE ADULT - PATIENT PORTAL LINK FT
You can access the [facility] Patient Portal, offered by [facility], by registering with the following website: [email or web address]

## 2018-11-14 NOTE — PROGRESS NOTE ADULT - SUBJECTIVE AND OBJECTIVE BOX
[facility]    CC- compartment syndrome RT foot    HPI:  Pt is a 44 y/o gentleman  with PMHx of HTN on hydrochlorothiazide who  was sent to the ED   by podiatry w/ right foot pain and swelling .  Pt was sent to the ED to r/o compartment syndrome. Pt  had a right foot fx 6 weeks ago s/p injury at work.   He had an air cast initially for R 1st and 2nd metatarsal fx.  He reports he had occas discomfort then today he had severe pain. Pt  denies f, chills, NVD, fever, numbness, tingling .    Fam Hx:  Mother alive at 62  Father alive at 65  Cousin DM (09 Nov 2018 17:56)    11/10/18- RT foot dressing dry, denies pain. No new complaints  11/11/18 no acute events, waiting for wound vac  11/12/18 spoke to podiatry- needs to go back to OR  11/13/18 s/p OR yesterday, in a lot of pain today  11/14/18 pain is better, wound vac applied    Review of system- All 10 systems reviewed and is as per HPI otherwise negative.     Vital Signs Last 24 Hrs  T(C): 36.7 (14 Nov 2018 11:24), Max: 37.9 (13 Nov 2018 17:15)  T(F): 98.1 (14 Nov 2018 11:24), Max: 100.3 (13 Nov 2018 17:15)  HR: 98 (14 Nov 2018 11:24) (98 - 115)  BP: 112/81 (14 Nov 2018 11:24) (112/81 - 156/88)  BP(mean): --  RR: 16 (14 Nov 2018 11:24) (16 - 20)  SpO2: 97% (14 Nov 2018 11:24) (94% - 98%)    LABS:                                14.0   7.27  )-----------( 215      ( 13 Nov 2018 06:26 )             39.4     13 Nov 2018 06:26    140    |  104    |  15     ----------------------------<  97     3.5     |  28     |  0.83     Ca    8.2        13 Nov 2018 06:26    RADIOLOGY & ADDITIONAL TESTS:  EXAM:  XR FOOT-RIGHT-2 VIEWS                        PROCEDURE DATE:  10/02/2018    INTERPRETATION:  History: Stone fell on right foot, hematoma.  2 views right foot.    There is acute oblique nondisplaced fracture the mid and distal shaftof   the first metatarsal. There is complete acute nondisplaced fracture mid   to distal shaft of the second metatarsal. No other fractures. No   dislocation. Joint spaces preserved. Marked soft tissue swelling/hematoma   over the dorsum of the foot.Small retrocalcaneal osteophyte.    Impression: Acute fractures right first and second metatarsal as   described.    PHYSICAL EXAM:  GENERAL: NAD, well-groomed, well-developed  HEAD:  Atraumatic, Normocephalic  EYES: EOMI, PERRLA, conjunctiva and sclera clear  HEENT: Moist mucous membranes  NECK: Supple, No JVD  NERVOUS SYSTEM:  Alert & Oriented X3, Motor Strength 5/5 B/L upper and lower extremities; DTRs 2+ intact and symmetric  CHEST/LUNG: Clear to auscultation bilaterally; No rales, rhonchi, wheezing, or rubs  HEART: Regular rate and rhythm; No murmurs, rubs, or gallops  ABDOMEN: Soft, Nontender, Nondistended; Bowel sounds present  GENITOURINARY- Voiding, no palpable bladder  EXTREMITIES:  2+ Peripheral Pulses, No clubbing, cyanosis, or edema  MUSCULOSKELTAL- RT foot dressing dry, wound vac on+  SKIN-no rash, no lesion  CNS- alert, oriented X3, non focal     MEDICATIONS  (STANDING):  amoxicillin  875 milliGRAM(s)/clavulanate 1 Tablet(s) Oral two times a day  docusate sodium 100 milliGRAM(s) Oral daily  enoxaparin Injectable 40 milliGRAM(s) SubCutaneous every 24 hours  hydrochlorothiazide 12.5 milliGRAM(s) Oral daily  sodium chloride 0.9%. 1000 milliLiter(s) (75 mL/Hr) IV Continuous <Continuous>    MEDICATIONS  (PRN):  acetaminophen   Tablet .. 650 milliGRAM(s) Oral every 6 hours PRN Mild Pain (1 - 3)  HYDROmorphone  Injectable 0.5 milliGRAM(s) IV Push every 3 hours PRN Moderate Pain (4 - 6)  HYDROmorphone  Injectable 0.5 milliGRAM(s) IV Push every 3 hours PRN Severe Pain (7 - 10)  ondansetron Injectable 4 milliGRAM(s) IV Push every 6 hours PRN Nausea and/or Vomiting  oxyCODONE    IR 5 milliGRAM(s) Oral every 4 hours PRN Mild Pain (1 - 3)  oxyCODONE    IR 10 milliGRAM(s) Oral every 4 hours PRN Moderate Pain (4 - 6)  oxyCODONE    IR 5 milliGRAM(s) Oral every 4 hours PRN Mild Pain (1 - 3)  oxyCODONE    IR 10 milliGRAM(s) Oral every 4 hours PRN Severe Pain (7 - 10)    Assessment/Plan  #Compartment syndrome RT foot s/p decompression/fasciotomy  #Acute fracture RT 1st and 2nd metatarsal  Podiatry f/u appreciated  S/p second surgery   Pain is better today- will send home on Oxycodone PRN  Cont Augmentin x7 days  DVT proph by Aspirin 325mg BID for 2 weeks  Wound vac applied, for outpatient f/u with DR Porter    #HTN- stable    #Dispo- home with home care/wound vac today. Scripts sent to [facility]. DC time 40 mins

## 2018-11-20 DIAGNOSIS — S92.311G: ICD-10-CM

## 2018-11-20 DIAGNOSIS — M79.A21 NONTRAUMATIC COMPARTMENT SYNDROME OF RIGHT LOWER EXTREMITY: ICD-10-CM

## 2018-11-20 DIAGNOSIS — I10 ESSENTIAL (PRIMARY) HYPERTENSION: ICD-10-CM

## 2018-11-20 DIAGNOSIS — Z79.899 OTHER LONG TERM (CURRENT) DRUG THERAPY: ICD-10-CM

## 2018-11-20 DIAGNOSIS — S92.321G: ICD-10-CM
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
## 2019-12-31 NOTE — BRIEF OPERATIVE NOTE - ELECTIVE PROCEDURE
CHIEF COMPLAINT/HISTORY OF PRESENT ILLNESS:  Fatemeh Ye is a 59 year old female who presents for a face to face encounter to activate power of attorneys.  She has had dementia that has worsened over the past year.      Past Medical History:   Diagnosis Date   • Diabetes (CMS/HCC)    • Dyslipidemia    • Early onset Alzheimer's dementia (CMS/HCC)    • Obesity    • Stress    • Tobacco abuse, episodic        No past surgical history on file.    ALLERGIES:   Allergen Reactions   • Lisinopril SWELLING     Angioedema, lip       Current Outpatient Medications   Medication Sig Dispense Refill   • levothyroxine (SYNTHROID, LEVOTHROID) 50 MCG tablet Take 1 tablet by mouth daily. 90 tablet 0   • triamterene-hydroCHLOROthiazide (MAXZIDE-25) 37.5-25 MG per tablet Take 0.5 tablets by mouth daily. 45 tablet 0   • lisinopril (ZESTRIL) 20 MG tablet TAKE 1 TABLET DAILY 90 tablet 0   • atorvastatin (LIPITOR) 40 MG tablet Take 1 tablet by mouth daily. 90 tablet 0   • Cholecalciferol (VITAMIN D) 50 mcg (2,000 units) capsule Take 1 capsule by mouth daily. 90 capsule 0   • donepezil (ARICEPT) 10 MG tablet Take 1 tablet by mouth nightly. 90 tablet 0   • glyBURIDE (DIABETA) 5 MG tablet Take 1 tablet by mouth daily (with breakfast). 90 tablet 0   • memantine (NAMENDA) 10 MG tablet Take 1 tablet by mouth daily. 90 tablet 1   • memantine (NAMENDA) 10 MG tablet Take 1 tablet by mouth daily. 90 tablet 4   • Coenzyme Q10 (COQ10 PO) Take by mouth daily.     • Ferrous Sulfate 27 MG Tab Take 1 tablet by mouth daily.     • fish oil-omega-3 fatty acids 1000 MG CAPS Take  by mouth.       • aspirin 81 MG chewable tablet Chew 81 mg by mouth daily.         No current facility-administered medications for this visit.        Family History   Problem Relation Age of Onset   • Heart Father    • Respiratory Father    • Respiratory Mother    • Cancer Maternal Aunt    • Heart Paternal Grandfather        Social History     Tobacco Use   • Smoking status: Passive  Smoke Exposure - Never Smoker   • Smokeless tobacco: Never Used   Substance Use Topics   • Alcohol use: Yes     Comment: 1/week   • Drug use: No       Patient Care Team:  Gladis Prescott NP as PCP - General (Nurse Practitioner)    REVIEW OF SYSTEMS: GENERAL:  Patient denies fever, chills, tiredness, malaise, weight loss, weight gain.  CARDIOVASCUALR:  Patient denies chest pain, shortness of breath, palpitations.  PSYCHIATRIC:  Patient denies symptoms of depression, feeling sad or blue and symptoms of anxiety, feeling jittery, panicky     PHYSICAL EXAMINATION:   General:   awake, alert and oriented and in no acute distress  Vitals:   Visit Vitals  /82 (BP Location: RUE - Right upper extremity, Patient Position: Sitting, Cuff Size: Large Adult)   Pulse 74   Wt 106.6 kg   SpO2 98%   BMI 41.63 kg/m²     Cardiovascular:   no JVD, S1 and S2 normal, no S3 or S4, no clicks, rubs or murmurs and regular rate and rhythm  Neuro:   gait normal, balance normal, coordination normal, speech fluent.  She is not orientated to time, place.      IMMUNIZATIONS:   Immunization History   Administered Date(s) Administered   • Influenza, injectable, quadrivalent 10/21/2015, 09/21/2018   • Influenza, injectable, quadrivalent, preservative-free 09/27/2019   • Pneumococcal polysaccharide, adult, 23 valent 09/21/2018   • Tdap 03/03/2016       ASSESSMET:   1. Dementia, advanced    PLAN  1. Paperwork completed to activate POA.  The paperwork will also need to be signed by her neurologist.  Arrangement will be made to have the neurologist to sign paperwork.     No

## 2020-01-01 ENCOUNTER — INPATIENT (INPATIENT)
Facility: HOSPITAL | Age: 45
LOS: 4 days | DRG: 870 | End: 2020-03-30
Attending: INTERNAL MEDICINE | Admitting: INTERNAL MEDICINE
Payer: SELF-PAY

## 2020-01-01 VITALS — HEIGHT: 63 IN | WEIGHT: 130.07 LBS

## 2020-01-01 DIAGNOSIS — R06.02 SHORTNESS OF BREATH: ICD-10-CM

## 2020-01-01 LAB
4/8 RATIO: 1.87 RATIO — SIGNIFICANT CHANGE UP (ref 0.9–3.6)
ABS CD8: 185 /UL — SIGNIFICANT CHANGE UP (ref 142–740)
ALBUMIN SERPL ELPH-MCNC: 1.4 G/DL — LOW (ref 3.3–5)
ALBUMIN SERPL ELPH-MCNC: 1.6 G/DL — LOW (ref 3.3–5)
ALBUMIN SERPL ELPH-MCNC: 1.7 G/DL — LOW (ref 3.3–5)
ALBUMIN SERPL ELPH-MCNC: 1.9 G/DL — LOW (ref 3.3–5)
ALBUMIN SERPL ELPH-MCNC: 2.5 G/DL — LOW (ref 3.3–5)
ALBUMIN SERPL ELPH-MCNC: 3 G/DL — LOW (ref 3.3–5)
ALP SERPL-CCNC: 100 U/L — SIGNIFICANT CHANGE UP (ref 40–120)
ALP SERPL-CCNC: 101 U/L — SIGNIFICANT CHANGE UP (ref 40–120)
ALP SERPL-CCNC: 74 U/L — SIGNIFICANT CHANGE UP (ref 40–120)
ALP SERPL-CCNC: 88 U/L — SIGNIFICANT CHANGE UP (ref 40–120)
ALP SERPL-CCNC: 88 U/L — SIGNIFICANT CHANGE UP (ref 40–120)
ALP SERPL-CCNC: 98 U/L — SIGNIFICANT CHANGE UP (ref 40–120)
ALT FLD-CCNC: 13 U/L — SIGNIFICANT CHANGE UP (ref 12–78)
ALT FLD-CCNC: 17 U/L — SIGNIFICANT CHANGE UP (ref 12–78)
ALT FLD-CCNC: 19 U/L — SIGNIFICANT CHANGE UP (ref 12–78)
ALT FLD-CCNC: 19 U/L — SIGNIFICANT CHANGE UP (ref 12–78)
ALT FLD-CCNC: 25 U/L — SIGNIFICANT CHANGE UP (ref 12–78)
ALT FLD-CCNC: 48 U/L — SIGNIFICANT CHANGE UP (ref 12–78)
ANION GAP SERPL CALC-SCNC: 4 MMOL/L — LOW (ref 5–17)
ANION GAP SERPL CALC-SCNC: 5 MMOL/L — SIGNIFICANT CHANGE UP (ref 5–17)
ANION GAP SERPL CALC-SCNC: 5 MMOL/L — SIGNIFICANT CHANGE UP (ref 5–17)
ANION GAP SERPL CALC-SCNC: 6 MMOL/L — SIGNIFICANT CHANGE UP (ref 5–17)
ANION GAP SERPL CALC-SCNC: 7 MMOL/L — SIGNIFICANT CHANGE UP (ref 5–17)
ANION GAP SERPL CALC-SCNC: 8 MMOL/L — SIGNIFICANT CHANGE UP (ref 5–17)
APPEARANCE UR: ABNORMAL
APPEARANCE UR: CLEAR — SIGNIFICANT CHANGE UP
APTT BLD: 23.9 SEC — LOW (ref 27.5–36.3)
APTT BLD: 29.5 SEC — SIGNIFICANT CHANGE UP (ref 27.5–36.3)
AST SERPL-CCNC: 114 U/L — HIGH (ref 15–37)
AST SERPL-CCNC: 495 U/L — HIGH (ref 15–37)
AST SERPL-CCNC: 87 U/L — HIGH (ref 15–37)
AST SERPL-CCNC: 88 U/L — HIGH (ref 15–37)
AST SERPL-CCNC: 92 U/L — HIGH (ref 15–37)
AST SERPL-CCNC: 97 U/L — HIGH (ref 15–37)
BACTERIA # UR AUTO: ABNORMAL
BASE EXCESS BLDA CALC-SCNC: -1 MMOL/L — SIGNIFICANT CHANGE UP (ref -2–2)
BASE EXCESS BLDA CALC-SCNC: -1.2 MMOL/L — SIGNIFICANT CHANGE UP (ref -2–2)
BASE EXCESS BLDA CALC-SCNC: -2.6 MMOL/L — LOW (ref -2–2)
BASE EXCESS BLDA CALC-SCNC: -8.7 MMOL/L — LOW (ref -2–2)
BASE EXCESS BLDA CALC-SCNC: 5.2 MMOL/L — HIGH (ref -2–2)
BASE EXCESS BLDA CALC-SCNC: 6.1 MMOL/L — HIGH (ref -2–2)
BASE EXCESS BLDV CALC-SCNC: 3 MMOL/L — HIGH (ref -2–2)
BASOPHILS # BLD AUTO: 0.01 K/UL — SIGNIFICANT CHANGE UP (ref 0–0.2)
BASOPHILS # BLD AUTO: 0.01 K/UL — SIGNIFICANT CHANGE UP (ref 0–0.2)
BASOPHILS NFR BLD AUTO: 0.1 % — SIGNIFICANT CHANGE UP (ref 0–2)
BASOPHILS NFR BLD AUTO: 0.2 % — SIGNIFICANT CHANGE UP (ref 0–2)
BILIRUB DIRECT SERPL-MCNC: 0.2 MG/DL — SIGNIFICANT CHANGE UP (ref 0–0.2)
BILIRUB INDIRECT FLD-MCNC: 0.2 MG/DL — SIGNIFICANT CHANGE UP (ref 0.2–1)
BILIRUB SERPL-MCNC: 0.4 MG/DL — SIGNIFICANT CHANGE UP (ref 0.2–1.2)
BILIRUB SERPL-MCNC: 0.8 MG/DL — SIGNIFICANT CHANGE UP (ref 0.2–1.2)
BILIRUB SERPL-MCNC: 1.3 MG/DL — HIGH (ref 0.2–1.2)
BILIRUB SERPL-MCNC: 2.1 MG/DL — HIGH (ref 0.2–1.2)
BILIRUB SERPL-MCNC: 2.2 MG/DL — HIGH (ref 0.2–1.2)
BILIRUB SERPL-MCNC: 3.8 MG/DL — HIGH (ref 0.2–1.2)
BILIRUB UR-MCNC: ABNORMAL
BILIRUB UR-MCNC: NEGATIVE — SIGNIFICANT CHANGE UP
BLOOD GAS COMMENTS ARTERIAL: SIGNIFICANT CHANGE UP
BUN SERPL-MCNC: 10 MG/DL — SIGNIFICANT CHANGE UP (ref 7–23)
BUN SERPL-MCNC: 13 MG/DL — SIGNIFICANT CHANGE UP (ref 7–23)
BUN SERPL-MCNC: 14 MG/DL — SIGNIFICANT CHANGE UP (ref 7–23)
BUN SERPL-MCNC: 17 MG/DL — SIGNIFICANT CHANGE UP (ref 7–23)
BUN SERPL-MCNC: 18 MG/DL — SIGNIFICANT CHANGE UP (ref 7–23)
BUN SERPL-MCNC: 33 MG/DL — HIGH (ref 7–23)
CALCIUM SERPL-MCNC: 7.2 MG/DL — LOW (ref 8.5–10.1)
CALCIUM SERPL-MCNC: 7.2 MG/DL — LOW (ref 8.5–10.1)
CALCIUM SERPL-MCNC: 7.4 MG/DL — LOW (ref 8.5–10.1)
CALCIUM SERPL-MCNC: 7.6 MG/DL — LOW (ref 8.5–10.1)
CALCIUM SERPL-MCNC: 7.9 MG/DL — LOW (ref 8.5–10.1)
CALCIUM SERPL-MCNC: 8.2 MG/DL — LOW (ref 8.5–10.1)
CD3 BLASTS SPEC-ACNC: 558 /UL — LOW (ref 672–1870)
CD3 BLASTS SPEC-ACNC: 67 % — SIGNIFICANT CHANGE UP (ref 59–83)
CD4 %: 41 % — SIGNIFICANT CHANGE UP (ref 30–62)
CD8 %: 22 % — SIGNIFICANT CHANGE UP (ref 12–36)
CHLORIDE SERPL-SCNC: 105 MMOL/L — SIGNIFICANT CHANGE UP (ref 96–108)
CHLORIDE SERPL-SCNC: 106 MMOL/L — SIGNIFICANT CHANGE UP (ref 96–108)
CHLORIDE SERPL-SCNC: 109 MMOL/L — HIGH (ref 96–108)
CHLORIDE SERPL-SCNC: 98 MMOL/L — SIGNIFICANT CHANGE UP (ref 96–108)
CO2 SERPL-SCNC: 26 MMOL/L — SIGNIFICANT CHANGE UP (ref 22–31)
CO2 SERPL-SCNC: 27 MMOL/L — SIGNIFICANT CHANGE UP (ref 22–31)
CO2 SERPL-SCNC: 28 MMOL/L — SIGNIFICANT CHANGE UP (ref 22–31)
CO2 SERPL-SCNC: 29 MMOL/L — SIGNIFICANT CHANGE UP (ref 22–31)
CO2 SERPL-SCNC: 30 MMOL/L — SIGNIFICANT CHANGE UP (ref 22–31)
CO2 SERPL-SCNC: 31 MMOL/L — SIGNIFICANT CHANGE UP (ref 22–31)
COLOR SPEC: ABNORMAL
COLOR SPEC: YELLOW — SIGNIFICANT CHANGE UP
COMMENT - URINE: SIGNIFICANT CHANGE UP
CREAT SERPL-MCNC: 0.69 MG/DL — SIGNIFICANT CHANGE UP (ref 0.5–1.3)
CREAT SERPL-MCNC: 0.71 MG/DL — SIGNIFICANT CHANGE UP (ref 0.5–1.3)
CREAT SERPL-MCNC: 0.95 MG/DL — SIGNIFICANT CHANGE UP (ref 0.5–1.3)
CREAT SERPL-MCNC: 1.08 MG/DL — SIGNIFICANT CHANGE UP (ref 0.5–1.3)
CREAT SERPL-MCNC: 1.14 MG/DL — SIGNIFICANT CHANGE UP (ref 0.5–1.3)
CREAT SERPL-MCNC: 2.98 MG/DL — HIGH (ref 0.5–1.3)
CRP SERPL-MCNC: 31.66 MG/DL — HIGH (ref 0–0.4)
CRP SERPL-MCNC: 31.71 MG/DL — HIGH (ref 0–0.4)
CRP SERPL-MCNC: 35.23 MG/DL — HIGH (ref 0–0.4)
CULTURE RESULTS: NO GROWTH — SIGNIFICANT CHANGE UP
CULTURE RESULTS: NO GROWTH — SIGNIFICANT CHANGE UP
DIFF PNL FLD: ABNORMAL
DIFF PNL FLD: ABNORMAL
EOSINOPHIL # BLD AUTO: 0 K/UL — SIGNIFICANT CHANGE UP (ref 0–0.5)
EOSINOPHIL # BLD AUTO: 0 K/UL — SIGNIFICANT CHANGE UP (ref 0–0.5)
EOSINOPHIL NFR BLD AUTO: 0 % — SIGNIFICANT CHANGE UP (ref 0–6)
EOSINOPHIL NFR BLD AUTO: 0 % — SIGNIFICANT CHANGE UP (ref 0–6)
EPI CELLS # UR: SIGNIFICANT CHANGE UP
FERRITIN SERPL-MCNC: 2599 NG/ML — HIGH (ref 30–400)
FERRITIN SERPL-MCNC: 2839 NG/ML — HIGH (ref 30–400)
FERRITIN SERPL-MCNC: 3014 NG/ML — HIGH (ref 30–400)
FERRITIN SERPL-MCNC: 3276 NG/ML — HIGH (ref 30–400)
FERRITIN SERPL-MCNC: 9507 NG/ML — HIGH (ref 30–400)
G6PD RBC-CCNC: 12.5 U/G HGB — SIGNIFICANT CHANGE UP (ref 7–20.5)
GAMMA INTERFERON BACKGROUND BLD IA-ACNC: 0.06 IU/ML — SIGNIFICANT CHANGE UP
GAS PNL BLDA: SIGNIFICANT CHANGE UP
GLUCOSE SERPL-MCNC: 100 MG/DL — HIGH (ref 70–99)
GLUCOSE SERPL-MCNC: 107 MG/DL — HIGH (ref 70–99)
GLUCOSE SERPL-MCNC: 114 MG/DL — HIGH (ref 70–99)
GLUCOSE SERPL-MCNC: 116 MG/DL — HIGH (ref 70–99)
GLUCOSE SERPL-MCNC: 132 MG/DL — HIGH (ref 70–99)
GLUCOSE SERPL-MCNC: 139 MG/DL — HIGH (ref 70–99)
GLUCOSE UR QL: NEGATIVE MG/DL — SIGNIFICANT CHANGE UP
GLUCOSE UR QL: NEGATIVE MG/DL — SIGNIFICANT CHANGE UP
GRAM STN FLD: SIGNIFICANT CHANGE UP
HBA1C BLD-MCNC: 5.2 % — SIGNIFICANT CHANGE UP (ref 4–5.6)
HCO3 BLDA-SCNC: 18 MMOL/L — LOW (ref 21–29)
HCO3 BLDA-SCNC: 24 MMOL/L — SIGNIFICANT CHANGE UP (ref 21–29)
HCO3 BLDA-SCNC: 25 MMOL/L — SIGNIFICANT CHANGE UP (ref 21–29)
HCO3 BLDA-SCNC: 30 MMOL/L — HIGH (ref 21–29)
HCO3 BLDV-SCNC: 26 MMOL/L — SIGNIFICANT CHANGE UP (ref 21–29)
HCT VFR BLD CALC: 36.6 % — LOW (ref 39–50)
HCT VFR BLD CALC: 37.8 % — LOW (ref 39–50)
HCT VFR BLD CALC: 39.6 % — SIGNIFICANT CHANGE UP (ref 39–50)
HCT VFR BLD CALC: 41.8 % — SIGNIFICANT CHANGE UP (ref 39–50)
HCT VFR BLD CALC: 43.4 % — SIGNIFICANT CHANGE UP (ref 39–50)
HCT VFR BLD CALC: 48.1 % — SIGNIFICANT CHANGE UP (ref 39–50)
HGB BLD-MCNC: 12.2 G/DL — LOW (ref 13–17)
HGB BLD-MCNC: 12.8 G/DL — LOW (ref 13–17)
HGB BLD-MCNC: 13.4 G/DL — SIGNIFICANT CHANGE UP (ref 13–17)
HGB BLD-MCNC: 14.3 G/DL — SIGNIFICANT CHANGE UP (ref 13–17)
HGB BLD-MCNC: 14.5 G/DL — SIGNIFICANT CHANGE UP (ref 13–17)
HGB BLD-MCNC: 17.2 G/DL — HIGH (ref 13–17)
IL6 FLD-MCNC: 1906.8 PG/ML — HIGH (ref 0–15.5)
IMM GRANULOCYTES NFR BLD AUTO: 0.5 % — SIGNIFICANT CHANGE UP (ref 0–1.5)
IMM GRANULOCYTES NFR BLD AUTO: 0.7 % — SIGNIFICANT CHANGE UP (ref 0–1.5)
INR BLD: 1.02 RATIO — SIGNIFICANT CHANGE UP (ref 0.88–1.16)
INR BLD: 1.05 RATIO — SIGNIFICANT CHANGE UP (ref 0.88–1.16)
KETONES UR-MCNC: ABNORMAL
KETONES UR-MCNC: NEGATIVE — SIGNIFICANT CHANGE UP
LACTATE SERPL-SCNC: 1.1 MMOL/L — SIGNIFICANT CHANGE UP (ref 0.7–2)
LACTATE SERPL-SCNC: 1.8 MMOL/L — SIGNIFICANT CHANGE UP (ref 0.7–2)
LDH SERPL L TO P-CCNC: 1210 U/L — HIGH (ref 84–241)
LDH SERPL L TO P-CCNC: 522 U/L — HIGH (ref 84–241)
LDH SERPL L TO P-CCNC: 562 U/L — HIGH (ref 84–241)
LDH SERPL L TO P-CCNC: 711 U/L — HIGH (ref 84–241)
LDH SERPL L TO P-CCNC: 835 U/L — HIGH (ref 84–241)
LEUKOCYTE ESTERASE UR-ACNC: ABNORMAL
LEUKOCYTE ESTERASE UR-ACNC: NEGATIVE — SIGNIFICANT CHANGE UP
LYMPHOCYTES # BLD AUTO: 0.51 K/UL — LOW (ref 1–3.3)
LYMPHOCYTES # BLD AUTO: 0.99 K/UL — LOW (ref 1–3.3)
LYMPHOCYTES # BLD AUTO: 13.1 % — SIGNIFICANT CHANGE UP (ref 13–44)
LYMPHOCYTES # BLD AUTO: 8.7 % — LOW (ref 13–44)
M TB IFN-G BLD-IMP: NEGATIVE — SIGNIFICANT CHANGE UP
M TB IFN-G CD4+ BCKGRND COR BLD-ACNC: 0 IU/ML — SIGNIFICANT CHANGE UP
M TB IFN-G CD4+CD8+ BCKGRND COR BLD-ACNC: 0 IU/ML — SIGNIFICANT CHANGE UP
MAGNESIUM SERPL-MCNC: 2 MG/DL — SIGNIFICANT CHANGE UP (ref 1.6–2.6)
MAGNESIUM SERPL-MCNC: 2.2 MG/DL — SIGNIFICANT CHANGE UP (ref 1.6–2.6)
MAGNESIUM SERPL-MCNC: 2.3 MG/DL — SIGNIFICANT CHANGE UP (ref 1.6–2.6)
MAGNESIUM SERPL-MCNC: 2.3 MG/DL — SIGNIFICANT CHANGE UP (ref 1.6–2.6)
MAGNESIUM SERPL-MCNC: 2.4 MG/DL — SIGNIFICANT CHANGE UP (ref 1.6–2.6)
MCHC RBC-ENTMCNC: 31.9 PG — SIGNIFICANT CHANGE UP (ref 27–34)
MCHC RBC-ENTMCNC: 31.9 PG — SIGNIFICANT CHANGE UP (ref 27–34)
MCHC RBC-ENTMCNC: 32.3 PG — SIGNIFICANT CHANGE UP (ref 27–34)
MCHC RBC-ENTMCNC: 32.3 PG — SIGNIFICANT CHANGE UP (ref 27–34)
MCHC RBC-ENTMCNC: 32.4 PG — SIGNIFICANT CHANGE UP (ref 27–34)
MCHC RBC-ENTMCNC: 32.7 PG — SIGNIFICANT CHANGE UP (ref 27–34)
MCHC RBC-ENTMCNC: 32.9 GM/DL — SIGNIFICANT CHANGE UP (ref 32–36)
MCHC RBC-ENTMCNC: 33.3 GM/DL — SIGNIFICANT CHANGE UP (ref 32–36)
MCHC RBC-ENTMCNC: 33.8 GM/DL — SIGNIFICANT CHANGE UP (ref 32–36)
MCHC RBC-ENTMCNC: 33.9 GM/DL — SIGNIFICANT CHANGE UP (ref 32–36)
MCHC RBC-ENTMCNC: 34.7 GM/DL — SIGNIFICANT CHANGE UP (ref 32–36)
MCHC RBC-ENTMCNC: 35.8 GM/DL — SIGNIFICANT CHANGE UP (ref 32–36)
MCV RBC AUTO: 90.2 FL — SIGNIFICANT CHANGE UP (ref 80–100)
MCV RBC AUTO: 93.5 FL — SIGNIFICANT CHANGE UP (ref 80–100)
MCV RBC AUTO: 95.5 FL — SIGNIFICANT CHANGE UP (ref 80–100)
MCV RBC AUTO: 95.8 FL — SIGNIFICANT CHANGE UP (ref 80–100)
MCV RBC AUTO: 96.6 FL — SIGNIFICANT CHANGE UP (ref 80–100)
MCV RBC AUTO: 96.9 FL — SIGNIFICANT CHANGE UP (ref 80–100)
MONOCYTES # BLD AUTO: 0.26 K/UL — SIGNIFICANT CHANGE UP (ref 0–0.9)
MONOCYTES # BLD AUTO: 0.57 K/UL — SIGNIFICANT CHANGE UP (ref 0–0.9)
MONOCYTES NFR BLD AUTO: 3.4 % — SIGNIFICANT CHANGE UP (ref 2–14)
MONOCYTES NFR BLD AUTO: 9.8 % — SIGNIFICANT CHANGE UP (ref 2–14)
NEUTROPHILS # BLD AUTO: 4.71 K/UL — SIGNIFICANT CHANGE UP (ref 1.8–7.4)
NEUTROPHILS # BLD AUTO: 6.26 K/UL — SIGNIFICANT CHANGE UP (ref 1.8–7.4)
NEUTROPHILS NFR BLD AUTO: 80.8 % — HIGH (ref 43–77)
NEUTROPHILS NFR BLD AUTO: 82.7 % — HIGH (ref 43–77)
NITRITE UR-MCNC: NEGATIVE — SIGNIFICANT CHANGE UP
NITRITE UR-MCNC: POSITIVE
NRBC # BLD: 2 /100 WBCS — HIGH (ref 0–0)
NT-PROBNP SERPL-SCNC: 12 PG/ML — SIGNIFICANT CHANGE UP (ref 0–125)
PCO2 BLDA: 41 MMHG — SIGNIFICANT CHANGE UP (ref 32–46)
PCO2 BLDA: 43 MMHG — SIGNIFICANT CHANGE UP (ref 32–46)
PCO2 BLDA: 48 MMHG — HIGH (ref 32–46)
PCO2 BLDA: 48 MMHG — HIGH (ref 32–46)
PCO2 BLDA: 50 MMHG — HIGH (ref 32–46)
PCO2 BLDA: 84 MMHG — CRITICAL HIGH (ref 32–46)
PCO2 BLDV: 37 MMHG — SIGNIFICANT CHANGE UP (ref 35–50)
PH BLDA: 7.18 — CRITICAL LOW (ref 7.35–7.45)
PH BLDA: 7.24 — LOW (ref 7.35–7.45)
PH BLDA: 7.3 — LOW (ref 7.35–7.45)
PH BLDA: 7.33 — LOW (ref 7.35–7.45)
PH BLDA: 7.42 — SIGNIFICANT CHANGE UP (ref 7.35–7.45)
PH BLDA: 7.48 — HIGH (ref 7.35–7.45)
PH BLDV: 7.46 — HIGH (ref 7.35–7.45)
PH UR: 5 — SIGNIFICANT CHANGE UP (ref 5–8)
PH UR: 6 — SIGNIFICANT CHANGE UP (ref 5–8)
PHOSPHATE SERPL-MCNC: 0.9 MG/DL — CRITICAL LOW (ref 2.5–4.5)
PHOSPHATE SERPL-MCNC: 1.4 MG/DL — LOW (ref 2.5–4.5)
PHOSPHATE SERPL-MCNC: 1.6 MG/DL — LOW (ref 2.5–4.5)
PHOSPHATE SERPL-MCNC: 2.4 MG/DL — LOW (ref 2.5–4.5)
PHOSPHATE SERPL-MCNC: 4.5 MG/DL — SIGNIFICANT CHANGE UP (ref 2.5–4.5)
PLATELET # BLD AUTO: 143 K/UL — LOW (ref 150–400)
PLATELET # BLD AUTO: 179 K/UL — SIGNIFICANT CHANGE UP (ref 150–400)
PLATELET # BLD AUTO: 210 K/UL — SIGNIFICANT CHANGE UP (ref 150–400)
PLATELET # BLD AUTO: 236 K/UL — SIGNIFICANT CHANGE UP (ref 150–400)
PLATELET # BLD AUTO: 297 K/UL — SIGNIFICANT CHANGE UP (ref 150–400)
PLATELET # BLD AUTO: 320 K/UL — SIGNIFICANT CHANGE UP (ref 150–400)
PO2 BLDA: 142 MMHG — HIGH (ref 74–108)
PO2 BLDA: 175 MMHG — HIGH (ref 74–108)
PO2 BLDA: 245 MMHG — HIGH (ref 74–108)
PO2 BLDA: 51 MMHG — LOW (ref 74–108)
PO2 BLDA: 80 MMHG — SIGNIFICANT CHANGE UP (ref 74–108)
PO2 BLDA: 92 MMHG — SIGNIFICANT CHANGE UP (ref 74–108)
PO2 BLDV: 42 MMHG — SIGNIFICANT CHANGE UP (ref 25–45)
POTASSIUM SERPL-MCNC: 2.8 MMOL/L — CRITICAL LOW (ref 3.5–5.3)
POTASSIUM SERPL-MCNC: 3.2 MMOL/L — LOW (ref 3.5–5.3)
POTASSIUM SERPL-MCNC: 3.5 MMOL/L — SIGNIFICANT CHANGE UP (ref 3.5–5.3)
POTASSIUM SERPL-MCNC: 3.6 MMOL/L — SIGNIFICANT CHANGE UP (ref 3.5–5.3)
POTASSIUM SERPL-MCNC: 3.7 MMOL/L — SIGNIFICANT CHANGE UP (ref 3.5–5.3)
POTASSIUM SERPL-MCNC: 4.2 MMOL/L — SIGNIFICANT CHANGE UP (ref 3.5–5.3)
POTASSIUM SERPL-SCNC: 2.8 MMOL/L — CRITICAL LOW (ref 3.5–5.3)
POTASSIUM SERPL-SCNC: 3.2 MMOL/L — LOW (ref 3.5–5.3)
POTASSIUM SERPL-SCNC: 3.5 MMOL/L — SIGNIFICANT CHANGE UP (ref 3.5–5.3)
POTASSIUM SERPL-SCNC: 3.6 MMOL/L — SIGNIFICANT CHANGE UP (ref 3.5–5.3)
POTASSIUM SERPL-SCNC: 3.7 MMOL/L — SIGNIFICANT CHANGE UP (ref 3.5–5.3)
POTASSIUM SERPL-SCNC: 4.2 MMOL/L — SIGNIFICANT CHANGE UP (ref 3.5–5.3)
PROCALCITONIN SERPL-MCNC: 0.35 NG/ML — HIGH (ref 0.02–0.1)
PROCALCITONIN SERPL-MCNC: 12.1 NG/ML — HIGH (ref 0.02–0.1)
PROCALCITONIN SERPL-MCNC: 6.28 NG/ML — HIGH (ref 0.02–0.1)
PROCALCITONIN SERPL-MCNC: 7.5 NG/ML — HIGH (ref 0.02–0.1)
PROCALCITONIN SERPL-MCNC: 87.3 NG/ML — HIGH (ref 0.02–0.1)
PROT SERPL-MCNC: 5.9 GM/DL — LOW (ref 6–8.3)
PROT SERPL-MCNC: 6 GM/DL — SIGNIFICANT CHANGE UP (ref 6–8.3)
PROT SERPL-MCNC: 6 GM/DL — SIGNIFICANT CHANGE UP (ref 6–8.3)
PROT SERPL-MCNC: 6.1 GM/DL — SIGNIFICANT CHANGE UP (ref 6–8.3)
PROT SERPL-MCNC: 6.3 GM/DL — SIGNIFICANT CHANGE UP (ref 6–8.3)
PROT SERPL-MCNC: 7.8 GM/DL — SIGNIFICANT CHANGE UP (ref 6–8.3)
PROT UR-MCNC: 100 MG/DL
PROT UR-MCNC: 100 MG/DL
PROTHROM AB SERPL-ACNC: 11.3 SEC — SIGNIFICANT CHANGE UP (ref 10–12.9)
PROTHROM AB SERPL-ACNC: 11.7 SEC — SIGNIFICANT CHANGE UP (ref 10–12.9)
QUANT TB PLUS MITOGEN MINUS NIL: 1.46 IU/ML — SIGNIFICANT CHANGE UP
RAPID RVP RESULT: SIGNIFICANT CHANGE UP
RBC # BLD: 3.82 M/UL — LOW (ref 4.2–5.8)
RBC # BLD: 3.96 M/UL — LOW (ref 4.2–5.8)
RBC # BLD: 4.1 M/UL — LOW (ref 4.2–5.8)
RBC # BLD: 4.47 M/UL — SIGNIFICANT CHANGE UP (ref 4.2–5.8)
RBC # BLD: 4.48 M/UL — SIGNIFICANT CHANGE UP (ref 4.2–5.8)
RBC # BLD: 5.33 M/UL — SIGNIFICANT CHANGE UP (ref 4.2–5.8)
RBC # FLD: 12.2 % — SIGNIFICANT CHANGE UP (ref 10.3–14.5)
RBC # FLD: 12.9 % — SIGNIFICANT CHANGE UP (ref 10.3–14.5)
RBC # FLD: 13.4 % — SIGNIFICANT CHANGE UP (ref 10.3–14.5)
RBC # FLD: 13.5 % — SIGNIFICANT CHANGE UP (ref 10.3–14.5)
RBC # FLD: 13.8 % — SIGNIFICANT CHANGE UP (ref 10.3–14.5)
RBC # FLD: 13.8 % — SIGNIFICANT CHANGE UP (ref 10.3–14.5)
RBC CASTS # UR COMP ASSIST: SIGNIFICANT CHANGE UP /HPF (ref 0–4)
SAO2 % BLDA: 87 % — LOW (ref 92–96)
SAO2 % BLDA: 96 % — SIGNIFICANT CHANGE UP (ref 92–96)
SAO2 % BLDA: 96 % — SIGNIFICANT CHANGE UP (ref 92–96)
SAO2 % BLDA: 99 % — HIGH (ref 92–96)
SAO2 % BLDV: 75 % — SIGNIFICANT CHANGE UP (ref 67–88)
SARS-COV-2 RNA SPEC QL NAA+PROBE: DETECTED
SARS-COV-2 RNA SPEC QL NAA+PROBE: DETECTED
SODIUM SERPL-SCNC: 133 MMOL/L — LOW (ref 135–145)
SODIUM SERPL-SCNC: 140 MMOL/L — SIGNIFICANT CHANGE UP (ref 135–145)
SODIUM SERPL-SCNC: 144 MMOL/L — SIGNIFICANT CHANGE UP (ref 135–145)
SODIUM SERPL-SCNC: 145 MMOL/L — SIGNIFICANT CHANGE UP (ref 135–145)
SP GR SPEC: 1.01 — SIGNIFICANT CHANGE UP (ref 1.01–1.02)
SP GR SPEC: 1.02 — SIGNIFICANT CHANGE UP (ref 1.01–1.02)
SPECIMEN SOURCE: SIGNIFICANT CHANGE UP
T-CELL CD4 SUBSET PNL BLD: 345 /UL — LOW (ref 489–1457)
TRIGL SERPL-MCNC: 212 MG/DL — HIGH (ref 10–149)
TROPONIN I SERPL-MCNC: <0.015 NG/ML — SIGNIFICANT CHANGE UP (ref 0.01–0.04)
UROBILINOGEN FLD QL: 8 MG/DL
UROBILINOGEN FLD QL: NEGATIVE MG/DL — SIGNIFICANT CHANGE UP
WBC # BLD: 11.12 K/UL — HIGH (ref 3.8–10.5)
WBC # BLD: 12.2 K/UL — HIGH (ref 3.8–10.5)
WBC # BLD: 19.62 K/UL — HIGH (ref 3.8–10.5)
WBC # BLD: 5.83 K/UL — SIGNIFICANT CHANGE UP (ref 3.8–10.5)
WBC # BLD: 7.57 K/UL — SIGNIFICANT CHANGE UP (ref 3.8–10.5)
WBC # BLD: 8.51 K/UL — SIGNIFICANT CHANGE UP (ref 3.8–10.5)
WBC # FLD AUTO: 11.12 K/UL — HIGH (ref 3.8–10.5)
WBC # FLD AUTO: 12.2 K/UL — HIGH (ref 3.8–10.5)
WBC # FLD AUTO: 19.62 K/UL — HIGH (ref 3.8–10.5)
WBC # FLD AUTO: 5.83 K/UL — SIGNIFICANT CHANGE UP (ref 3.8–10.5)
WBC # FLD AUTO: 7.57 K/UL — SIGNIFICANT CHANGE UP (ref 3.8–10.5)
WBC # FLD AUTO: 8.51 K/UL — SIGNIFICANT CHANGE UP (ref 3.8–10.5)
WBC UR QL: SIGNIFICANT CHANGE UP

## 2020-01-01 PROCEDURE — 93010 ELECTROCARDIOGRAM REPORT: CPT

## 2020-01-01 PROCEDURE — 84100 ASSAY OF PHOSPHORUS: CPT

## 2020-01-01 PROCEDURE — 99291 CRITICAL CARE FIRST HOUR: CPT

## 2020-01-01 PROCEDURE — 81001 URINALYSIS AUTO W/SCOPE: CPT

## 2020-01-01 PROCEDURE — 82955 ASSAY OF G6PD ENZYME: CPT

## 2020-01-01 PROCEDURE — 71045 X-RAY EXAM CHEST 1 VIEW: CPT | Mod: 26

## 2020-01-01 PROCEDURE — 85610 PROTHROMBIN TIME: CPT

## 2020-01-01 PROCEDURE — 83735 ASSAY OF MAGNESIUM: CPT

## 2020-01-01 PROCEDURE — 83036 HEMOGLOBIN GLYCOSYLATED A1C: CPT

## 2020-01-01 PROCEDURE — 83880 ASSAY OF NATRIURETIC PEPTIDE: CPT

## 2020-01-01 PROCEDURE — 36600 WITHDRAWAL OF ARTERIAL BLOOD: CPT

## 2020-01-01 PROCEDURE — 76937 US GUIDE VASCULAR ACCESS: CPT

## 2020-01-01 PROCEDURE — 85379 FIBRIN DEGRADATION QUANT: CPT

## 2020-01-01 PROCEDURE — 36556 INSERT NON-TUNNEL CV CATH: CPT

## 2020-01-01 PROCEDURE — 71045 X-RAY EXAM CHEST 1 VIEW: CPT | Mod: 26,77

## 2020-01-01 PROCEDURE — 86140 C-REACTIVE PROTEIN: CPT

## 2020-01-01 PROCEDURE — 85025 COMPLETE CBC W/AUTO DIFF WBC: CPT

## 2020-01-01 PROCEDURE — 94003 VENT MGMT INPAT SUBQ DAY: CPT

## 2020-01-01 PROCEDURE — 87070 CULTURE OTHR SPECIMN AEROBIC: CPT

## 2020-01-01 PROCEDURE — 85652 RBC SED RATE AUTOMATED: CPT

## 2020-01-01 PROCEDURE — 71045 X-RAY EXAM CHEST 1 VIEW: CPT

## 2020-01-01 PROCEDURE — 83520 IMMUNOASSAY QUANT NOS NONAB: CPT

## 2020-01-01 PROCEDURE — 87086 URINE CULTURE/COLONY COUNT: CPT

## 2020-01-01 PROCEDURE — 86360 T CELL ABSOLUTE COUNT/RATIO: CPT

## 2020-01-01 PROCEDURE — 82803 BLOOD GASES ANY COMBINATION: CPT

## 2020-01-01 PROCEDURE — 82550 ASSAY OF CK (CPK): CPT

## 2020-01-01 PROCEDURE — 74018 RADEX ABDOMEN 1 VIEW: CPT | Mod: 26

## 2020-01-01 PROCEDURE — 86480 TB TEST CELL IMMUN MEASURE: CPT

## 2020-01-01 PROCEDURE — 74018 RADEX ABDOMEN 1 VIEW: CPT

## 2020-01-01 PROCEDURE — 84484 ASSAY OF TROPONIN QUANT: CPT

## 2020-01-01 PROCEDURE — 84478 ASSAY OF TRIGLYCERIDES: CPT

## 2020-01-01 PROCEDURE — C9113: CPT

## 2020-01-01 PROCEDURE — 80053 COMPREHEN METABOLIC PANEL: CPT

## 2020-01-01 PROCEDURE — 83615 LACTATE (LD) (LDH) ENZYME: CPT

## 2020-01-01 PROCEDURE — 87040 BLOOD CULTURE FOR BACTERIA: CPT

## 2020-01-01 PROCEDURE — 85730 THROMBOPLASTIN TIME PARTIAL: CPT

## 2020-01-01 PROCEDURE — 83605 ASSAY OF LACTIC ACID: CPT

## 2020-01-01 PROCEDURE — 85027 COMPLETE CBC AUTOMATED: CPT

## 2020-01-01 PROCEDURE — 82728 ASSAY OF FERRITIN: CPT

## 2020-01-01 PROCEDURE — 36415 COLL VENOUS BLD VENIPUNCTURE: CPT

## 2020-01-01 PROCEDURE — 84145 PROCALCITONIN (PCT): CPT

## 2020-01-01 PROCEDURE — 99292 CRITICAL CARE ADDL 30 MIN: CPT

## 2020-01-01 PROCEDURE — 82248 BILIRUBIN DIRECT: CPT

## 2020-01-01 RX ORDER — POTASSIUM CHLORIDE 20 MEQ
40 PACKET (EA) ORAL ONCE
Refills: 0 | Status: COMPLETED | OUTPATIENT
Start: 2020-01-01 | End: 2020-01-01

## 2020-01-01 RX ORDER — SODIUM CHLORIDE 9 MG/ML
1850 INJECTION INTRAMUSCULAR; INTRAVENOUS; SUBCUTANEOUS ONCE
Refills: 0 | Status: COMPLETED | OUTPATIENT
Start: 2020-01-01 | End: 2020-01-01

## 2020-01-01 RX ORDER — MIDAZOLAM HYDROCHLORIDE 1 MG/ML
2 INJECTION, SOLUTION INTRAMUSCULAR; INTRAVENOUS ONCE
Refills: 0 | Status: DISCONTINUED | OUTPATIENT
Start: 2020-01-01 | End: 2020-01-01

## 2020-01-01 RX ORDER — PROPOFOL 10 MG/ML
20 INJECTION, EMULSION INTRAVENOUS
Qty: 1000 | Refills: 0 | Status: DISCONTINUED | OUTPATIENT
Start: 2020-01-01 | End: 2020-01-01

## 2020-01-01 RX ORDER — HYDROXYCHLOROQUINE SULFATE 200 MG
TABLET ORAL
Refills: 0 | Status: DISCONTINUED | OUTPATIENT
Start: 2020-01-01 | End: 2020-01-01

## 2020-01-01 RX ORDER — AZITHROMYCIN 500 MG/1
500 TABLET, FILM COATED ORAL ONCE
Refills: 0 | Status: COMPLETED | OUTPATIENT
Start: 2020-01-01 | End: 2020-01-01

## 2020-01-01 RX ORDER — SODIUM,POTASSIUM PHOSPHATES 278-250MG
2 POWDER IN PACKET (EA) ORAL
Refills: 0 | Status: DISCONTINUED | OUTPATIENT
Start: 2020-01-01 | End: 2020-01-01

## 2020-01-01 RX ORDER — POLYETHYLENE GLYCOL 3350 17 G/17G
17 POWDER, FOR SOLUTION ORAL DAILY
Refills: 0 | Status: DISCONTINUED | OUTPATIENT
Start: 2020-01-01 | End: 2020-01-01

## 2020-01-01 RX ORDER — MIDAZOLAM HYDROCHLORIDE 1 MG/ML
0.02 INJECTION, SOLUTION INTRAMUSCULAR; INTRAVENOUS
Qty: 100 | Refills: 0 | Status: DISCONTINUED | OUTPATIENT
Start: 2020-01-01 | End: 2020-01-01

## 2020-01-01 RX ORDER — POTASSIUM PHOSPHATE, MONOBASIC POTASSIUM PHOSPHATE, DIBASIC 236; 224 MG/ML; MG/ML
15 INJECTION, SOLUTION INTRAVENOUS
Refills: 0 | Status: COMPLETED | OUTPATIENT
Start: 2020-01-01 | End: 2020-01-01

## 2020-01-01 RX ORDER — FUROSEMIDE 40 MG
80 TABLET ORAL ONCE
Refills: 0 | Status: COMPLETED | OUTPATIENT
Start: 2020-01-01 | End: 2020-01-01

## 2020-01-01 RX ORDER — ROCURONIUM BROMIDE 10 MG/ML
50 VIAL (ML) INTRAVENOUS ONCE
Refills: 0 | Status: COMPLETED | OUTPATIENT
Start: 2020-01-01 | End: 2020-01-01

## 2020-01-01 RX ORDER — HYDROXYCHLOROQUINE SULFATE 200 MG
400 TABLET ORAL
Refills: 0 | Status: COMPLETED | OUTPATIENT
Start: 2020-01-01 | End: 2020-01-01

## 2020-01-01 RX ORDER — NOREPINEPHRINE BITARTRATE/D5W 8 MG/250ML
0.05 PLASTIC BAG, INJECTION (ML) INTRAVENOUS
Qty: 8 | Refills: 0 | Status: DISCONTINUED | OUTPATIENT
Start: 2020-01-01 | End: 2020-01-01

## 2020-01-01 RX ORDER — FUROSEMIDE 40 MG
40 TABLET ORAL ONCE
Refills: 0 | Status: COMPLETED | OUTPATIENT
Start: 2020-01-01 | End: 2020-01-01

## 2020-01-01 RX ORDER — HYDROXYCHLOROQUINE SULFATE 200 MG
200 TABLET ORAL EVERY 12 HOURS
Refills: 0 | Status: DISCONTINUED | OUTPATIENT
Start: 2020-01-01 | End: 2020-01-01

## 2020-01-01 RX ORDER — ACETAMINOPHEN 500 MG
650 TABLET ORAL EVERY 6 HOURS
Refills: 0 | Status: DISCONTINUED | OUTPATIENT
Start: 2020-01-01 | End: 2020-01-01

## 2020-01-01 RX ORDER — CHLORHEXIDINE GLUCONATE 213 G/1000ML
1 SOLUTION TOPICAL
Refills: 0 | Status: DISCONTINUED | OUTPATIENT
Start: 2020-01-01 | End: 2020-01-01

## 2020-01-01 RX ORDER — ACETAMINOPHEN 500 MG
1000 TABLET ORAL ONCE
Refills: 0 | Status: COMPLETED | OUTPATIENT
Start: 2020-01-01 | End: 2020-01-01

## 2020-01-01 RX ORDER — AZITHROMYCIN 500 MG/1
TABLET, FILM COATED ORAL
Refills: 0 | Status: DISCONTINUED | OUTPATIENT
Start: 2020-01-01 | End: 2020-01-01

## 2020-01-01 RX ORDER — PANTOPRAZOLE SODIUM 20 MG/1
40 TABLET, DELAYED RELEASE ORAL DAILY
Refills: 0 | Status: DISCONTINUED | OUTPATIENT
Start: 2020-01-01 | End: 2020-01-01

## 2020-01-01 RX ORDER — ENOXAPARIN SODIUM 100 MG/ML
40 INJECTION SUBCUTANEOUS DAILY
Refills: 0 | Status: DISCONTINUED | OUTPATIENT
Start: 2020-01-01 | End: 2020-01-01

## 2020-01-01 RX ORDER — VASOPRESSIN 20 [USP'U]/ML
0.1 INJECTION INTRAVENOUS
Qty: 50 | Refills: 0 | Status: DISCONTINUED | OUTPATIENT
Start: 2020-01-01 | End: 2020-01-01

## 2020-01-01 RX ORDER — CEFTRIAXONE 500 MG/1
1000 INJECTION, POWDER, FOR SOLUTION INTRAMUSCULAR; INTRAVENOUS ONCE
Refills: 0 | Status: COMPLETED | OUTPATIENT
Start: 2020-01-01 | End: 2020-01-01

## 2020-01-01 RX ORDER — FENTANYL CITRATE 50 UG/ML
0.5 INJECTION INTRAVENOUS
Qty: 2500 | Refills: 0 | Status: DISCONTINUED | OUTPATIENT
Start: 2020-01-01 | End: 2020-01-01

## 2020-01-01 RX ORDER — FENTANYL CITRATE 50 UG/ML
100 INJECTION INTRAVENOUS ONCE
Refills: 0 | Status: DISCONTINUED | OUTPATIENT
Start: 2020-01-01 | End: 2020-01-01

## 2020-01-01 RX ORDER — SODIUM CHLORIDE 9 MG/ML
1000 INJECTION, SOLUTION INTRAVENOUS ONCE
Refills: 0 | Status: COMPLETED | OUTPATIENT
Start: 2020-01-01 | End: 2020-01-01

## 2020-01-01 RX ORDER — POTASSIUM PHOSPHATE, MONOBASIC POTASSIUM PHOSPHATE, DIBASIC 236; 224 MG/ML; MG/ML
30 INJECTION, SOLUTION INTRAVENOUS ONCE
Refills: 0 | Status: DISCONTINUED | OUTPATIENT
Start: 2020-01-01 | End: 2020-01-01

## 2020-01-01 RX ORDER — POTASSIUM CHLORIDE 20 MEQ
10 PACKET (EA) ORAL ONCE
Refills: 0 | Status: COMPLETED | OUTPATIENT
Start: 2020-01-01 | End: 2020-01-01

## 2020-01-01 RX ORDER — PIPERACILLIN AND TAZOBACTAM 4; .5 G/20ML; G/20ML
3.38 INJECTION, POWDER, LYOPHILIZED, FOR SOLUTION INTRAVENOUS EVERY 8 HOURS
Refills: 0 | Status: DISCONTINUED | OUTPATIENT
Start: 2020-01-01 | End: 2020-01-01

## 2020-01-01 RX ORDER — SUCCINYLCHOLINE CHLORIDE 100 MG/5ML
100 SYRINGE (ML) INTRAVENOUS ONCE
Refills: 0 | Status: COMPLETED | OUTPATIENT
Start: 2020-01-01 | End: 2020-01-01

## 2020-01-01 RX ORDER — PHENYLEPHRINE HYDROCHLORIDE 10 MG/ML
0.1 INJECTION INTRAVENOUS
Qty: 40 | Refills: 0 | Status: DISCONTINUED | OUTPATIENT
Start: 2020-01-01 | End: 2020-01-01

## 2020-01-01 RX ORDER — PHENYLEPHRINE HYDROCHLORIDE 10 MG/ML
10 INJECTION INTRAVENOUS
Qty: 40 | Refills: 0 | Status: DISCONTINUED | OUTPATIENT
Start: 2020-01-01 | End: 2020-01-01

## 2020-01-01 RX ORDER — PIPERACILLIN AND TAZOBACTAM 4; .5 G/20ML; G/20ML
3.38 INJECTION, POWDER, LYOPHILIZED, FOR SOLUTION INTRAVENOUS ONCE
Refills: 0 | Status: COMPLETED | OUTPATIENT
Start: 2020-01-01 | End: 2020-01-01

## 2020-01-01 RX ORDER — CISATRACURIUM BESYLATE 2 MG/ML
3 INJECTION INTRAVENOUS
Qty: 200 | Refills: 0 | Status: DISCONTINUED | OUTPATIENT
Start: 2020-01-01 | End: 2020-01-01

## 2020-01-01 RX ORDER — VANCOMYCIN HCL 1 G
1000 VIAL (EA) INTRAVENOUS ONCE
Refills: 0 | Status: COMPLETED | OUTPATIENT
Start: 2020-01-01 | End: 2020-01-01

## 2020-01-01 RX ORDER — DEXMEDETOMIDINE HYDROCHLORIDE IN 0.9% SODIUM CHLORIDE 4 UG/ML
0.5 INJECTION INTRAVENOUS
Qty: 200 | Refills: 0 | Status: DISCONTINUED | OUTPATIENT
Start: 2020-01-01 | End: 2020-01-01

## 2020-01-01 RX ORDER — ETOMIDATE 2 MG/ML
20 INJECTION INTRAVENOUS ONCE
Refills: 0 | Status: COMPLETED | OUTPATIENT
Start: 2020-01-01 | End: 2020-01-01

## 2020-01-01 RX ORDER — AZITHROMYCIN 500 MG/1
250 TABLET, FILM COATED ORAL EVERY 24 HOURS
Refills: 0 | Status: COMPLETED | OUTPATIENT
Start: 2020-01-01 | End: 2020-01-01

## 2020-01-01 RX ORDER — AZITHROMYCIN 500 MG/1
500 TABLET, FILM COATED ORAL ONCE
Refills: 0 | Status: DISCONTINUED | OUTPATIENT
Start: 2020-01-01 | End: 2020-01-01

## 2020-01-01 RX ORDER — CHLORHEXIDINE GLUCONATE 213 G/1000ML
15 SOLUTION TOPICAL EVERY 12 HOURS
Refills: 0 | Status: DISCONTINUED | OUTPATIENT
Start: 2020-01-01 | End: 2020-01-01

## 2020-01-01 RX ORDER — MIDAZOLAM HYDROCHLORIDE 1 MG/ML
4 INJECTION, SOLUTION INTRAMUSCULAR; INTRAVENOUS ONCE
Refills: 0 | Status: DISCONTINUED | OUTPATIENT
Start: 2020-01-01 | End: 2020-01-01

## 2020-01-01 RX ADMIN — Medication 1 APPLICATION(S): at 16:05

## 2020-01-01 RX ADMIN — PHENYLEPHRINE HYDROCHLORIDE 221 MICROGRAM(S)/KG/MIN: 10 INJECTION INTRAVENOUS at 01:43

## 2020-01-01 RX ADMIN — Medication 80 MILLIGRAM(S): at 23:00

## 2020-01-01 RX ADMIN — AZITHROMYCIN 250 MILLIGRAM(S): 500 TABLET, FILM COATED ORAL at 23:36

## 2020-01-01 RX ADMIN — POLYETHYLENE GLYCOL 3350 17 GRAM(S): 17 POWDER, FOR SOLUTION ORAL at 11:23

## 2020-01-01 RX ADMIN — ENOXAPARIN SODIUM 40 MILLIGRAM(S): 100 INJECTION SUBCUTANEOUS at 11:22

## 2020-01-01 RX ADMIN — Medication 1 APPLICATION(S): at 17:29

## 2020-01-01 RX ADMIN — MIDAZOLAM HYDROCHLORIDE 1.18 MG/KG/HR: 1 INJECTION, SOLUTION INTRAMUSCULAR; INTRAVENOUS at 17:55

## 2020-01-01 RX ADMIN — PIPERACILLIN AND TAZOBACTAM 25 GRAM(S): 4; .5 INJECTION, POWDER, LYOPHILIZED, FOR SOLUTION INTRAVENOUS at 22:10

## 2020-01-01 RX ADMIN — PROPOFOL 7.08 MICROGRAM(S)/KG/MIN: 10 INJECTION, EMULSION INTRAVENOUS at 01:04

## 2020-01-01 RX ADMIN — PHENYLEPHRINE HYDROCHLORIDE 2.21 MICROGRAM(S)/KG/MIN: 10 INJECTION INTRAVENOUS at 20:19

## 2020-01-01 RX ADMIN — MIDAZOLAM HYDROCHLORIDE 1.18 MG/KG/HR: 1 INJECTION, SOLUTION INTRAMUSCULAR; INTRAVENOUS at 03:39

## 2020-01-01 RX ADMIN — PIPERACILLIN AND TAZOBACTAM 25 GRAM(S): 4; .5 INJECTION, POWDER, LYOPHILIZED, FOR SOLUTION INTRAVENOUS at 15:42

## 2020-01-01 RX ADMIN — FENTANYL CITRATE 2.95 MICROGRAM(S)/KG/HR: 50 INJECTION INTRAVENOUS at 19:04

## 2020-01-01 RX ADMIN — PROPOFOL 7.08 MICROGRAM(S)/KG/MIN: 10 INJECTION, EMULSION INTRAVENOUS at 06:33

## 2020-01-01 RX ADMIN — Medication 650 MILLIGRAM(S): at 06:00

## 2020-01-01 RX ADMIN — Medication 650 MILLIGRAM(S): at 05:00

## 2020-01-01 RX ADMIN — CHLORHEXIDINE GLUCONATE 15 MILLILITER(S): 213 SOLUTION TOPICAL at 06:22

## 2020-01-01 RX ADMIN — FENTANYL CITRATE 2.95 MICROGRAM(S)/KG/HR: 50 INJECTION INTRAVENOUS at 05:00

## 2020-01-01 RX ADMIN — PROPOFOL 7.08 MICROGRAM(S)/KG/MIN: 10 INJECTION, EMULSION INTRAVENOUS at 20:05

## 2020-01-01 RX ADMIN — PROPOFOL 7.08 MICROGRAM(S)/KG/MIN: 10 INJECTION, EMULSION INTRAVENOUS at 05:23

## 2020-01-01 RX ADMIN — Medication 1 APPLICATION(S): at 22:50

## 2020-01-01 RX ADMIN — PANTOPRAZOLE SODIUM 40 MILLIGRAM(S): 20 TABLET, DELAYED RELEASE ORAL at 11:01

## 2020-01-01 RX ADMIN — Medication 250 MILLIGRAM(S): at 03:30

## 2020-01-01 RX ADMIN — ENOXAPARIN SODIUM 40 MILLIGRAM(S): 100 INJECTION SUBCUTANEOUS at 11:23

## 2020-01-01 RX ADMIN — Medication 1 APPLICATION(S): at 01:58

## 2020-01-01 RX ADMIN — ENOXAPARIN SODIUM 40 MILLIGRAM(S): 100 INJECTION SUBCUTANEOUS at 11:00

## 2020-01-01 RX ADMIN — ENOXAPARIN SODIUM 40 MILLIGRAM(S): 100 INJECTION SUBCUTANEOUS at 11:51

## 2020-01-01 RX ADMIN — PROPOFOL 7.08 MICROGRAM(S)/KG/MIN: 10 INJECTION, EMULSION INTRAVENOUS at 07:10

## 2020-01-01 RX ADMIN — Medication 5.53 MICROGRAM(S)/KG/MIN: at 10:16

## 2020-01-01 RX ADMIN — PHENYLEPHRINE HYDROCHLORIDE 2.21 MICROGRAM(S)/KG/MIN: 10 INJECTION INTRAVENOUS at 16:05

## 2020-01-01 RX ADMIN — AZITHROMYCIN 250 MILLIGRAM(S): 500 TABLET, FILM COATED ORAL at 23:17

## 2020-01-01 RX ADMIN — Medication 40 MILLIGRAM(S): at 11:22

## 2020-01-01 RX ADMIN — CHLORHEXIDINE GLUCONATE 1 APPLICATION(S): 213 SOLUTION TOPICAL at 06:15

## 2020-01-01 RX ADMIN — CEFTRIAXONE 1000 MILLIGRAM(S): 500 INJECTION, POWDER, FOR SOLUTION INTRAMUSCULAR; INTRAVENOUS at 22:44

## 2020-01-01 RX ADMIN — AZITHROMYCIN 250 MILLIGRAM(S): 500 TABLET, FILM COATED ORAL at 23:00

## 2020-01-01 RX ADMIN — PIPERACILLIN AND TAZOBACTAM 25 GRAM(S): 4; .5 INJECTION, POWDER, LYOPHILIZED, FOR SOLUTION INTRAVENOUS at 06:30

## 2020-01-01 RX ADMIN — CISATRACURIUM BESYLATE 10.6 MICROGRAM(S)/KG/MIN: 2 INJECTION INTRAVENOUS at 04:47

## 2020-01-01 RX ADMIN — Medication 1 APPLICATION(S): at 06:24

## 2020-01-01 RX ADMIN — PROPOFOL 7.08 MICROGRAM(S)/KG/MIN: 10 INJECTION, EMULSION INTRAVENOUS at 17:11

## 2020-01-01 RX ADMIN — FENTANYL CITRATE 2.95 MICROGRAM(S)/KG/HR: 50 INJECTION INTRAVENOUS at 03:38

## 2020-01-01 RX ADMIN — Medication 650 MILLIGRAM(S): at 04:30

## 2020-01-01 RX ADMIN — SODIUM CHLORIDE 1850 MILLILITER(S): 9 INJECTION INTRAMUSCULAR; INTRAVENOUS; SUBCUTANEOUS at 23:00

## 2020-01-01 RX ADMIN — VASOPRESSIN 6 UNIT(S)/MIN: 20 INJECTION INTRAVENOUS at 04:50

## 2020-01-01 RX ADMIN — CHLORHEXIDINE GLUCONATE 1 APPLICATION(S): 213 SOLUTION TOPICAL at 06:25

## 2020-01-01 RX ADMIN — SODIUM CHLORIDE 1850 MILLILITER(S): 9 INJECTION INTRAMUSCULAR; INTRAVENOUS; SUBCUTANEOUS at 22:00

## 2020-01-01 RX ADMIN — POTASSIUM PHOSPHATE, MONOBASIC POTASSIUM PHOSPHATE, DIBASIC 63.75 MILLIMOLE(S): 236; 224 INJECTION, SOLUTION INTRAVENOUS at 06:33

## 2020-01-01 RX ADMIN — MIDAZOLAM HYDROCHLORIDE 2 MILLIGRAM(S): 1 INJECTION, SOLUTION INTRAMUSCULAR; INTRAVENOUS at 01:50

## 2020-01-01 RX ADMIN — Medication 200 MILLIGRAM(S): at 17:29

## 2020-01-01 RX ADMIN — Medication 100 MILLIEQUIVALENT(S): at 23:26

## 2020-01-01 RX ADMIN — DEXMEDETOMIDINE HYDROCHLORIDE IN 0.9% SODIUM CHLORIDE 7.38 MICROGRAM(S)/KG/HR: 4 INJECTION INTRAVENOUS at 07:45

## 2020-01-01 RX ADMIN — Medication 650 MILLIGRAM(S): at 21:00

## 2020-01-01 RX ADMIN — Medication 50 MILLIGRAM(S): at 00:14

## 2020-01-01 RX ADMIN — Medication 200 MILLIGRAM(S): at 17:54

## 2020-01-01 RX ADMIN — Medication 5.53 MICROGRAM(S)/KG/MIN: at 21:21

## 2020-01-01 RX ADMIN — Medication 400 MILLIGRAM(S): at 19:19

## 2020-01-01 RX ADMIN — Medication 1 APPLICATION(S): at 13:42

## 2020-01-01 RX ADMIN — PHENYLEPHRINE HYDROCHLORIDE 2.21 MICROGRAM(S)/KG/MIN: 10 INJECTION INTRAVENOUS at 00:32

## 2020-01-01 RX ADMIN — Medication 125 MILLIGRAM(S): at 02:13

## 2020-01-01 RX ADMIN — DEXMEDETOMIDINE HYDROCHLORIDE IN 0.9% SODIUM CHLORIDE 7.38 MICROGRAM(S)/KG/HR: 4 INJECTION INTRAVENOUS at 19:50

## 2020-01-01 RX ADMIN — CHLORHEXIDINE GLUCONATE 15 MILLILITER(S): 213 SOLUTION TOPICAL at 19:19

## 2020-01-01 RX ADMIN — CHLORHEXIDINE GLUCONATE 15 MILLILITER(S): 213 SOLUTION TOPICAL at 17:54

## 2020-01-01 RX ADMIN — CHLORHEXIDINE GLUCONATE 15 MILLILITER(S): 213 SOLUTION TOPICAL at 17:10

## 2020-01-01 RX ADMIN — Medication 40 MILLIEQUIVALENT(S): at 06:32

## 2020-01-01 RX ADMIN — PIPERACILLIN AND TAZOBACTAM 200 GRAM(S): 4; .5 INJECTION, POWDER, LYOPHILIZED, FOR SOLUTION INTRAVENOUS at 03:30

## 2020-01-01 RX ADMIN — Medication 2 PACKET(S): at 17:29

## 2020-01-01 RX ADMIN — Medication 200 MILLIGRAM(S): at 17:10

## 2020-01-01 RX ADMIN — Medication 200 MILLIGRAM(S): at 05:22

## 2020-01-01 RX ADMIN — CHLORHEXIDINE GLUCONATE 1 APPLICATION(S): 213 SOLUTION TOPICAL at 05:22

## 2020-01-01 RX ADMIN — Medication 1 APPLICATION(S): at 06:31

## 2020-01-01 RX ADMIN — SODIUM CHLORIDE 2000 MILLILITER(S): 9 INJECTION, SOLUTION INTRAVENOUS at 09:30

## 2020-01-01 RX ADMIN — Medication 650 MILLIGRAM(S): at 19:04

## 2020-01-01 RX ADMIN — DEXMEDETOMIDINE HYDROCHLORIDE IN 0.9% SODIUM CHLORIDE 7.38 MICROGRAM(S)/KG/HR: 4 INJECTION INTRAVENOUS at 22:20

## 2020-01-01 RX ADMIN — PANTOPRAZOLE SODIUM 40 MILLIGRAM(S): 20 TABLET, DELAYED RELEASE ORAL at 11:51

## 2020-01-01 RX ADMIN — Medication 650 MILLIGRAM(S): at 19:19

## 2020-01-01 RX ADMIN — Medication 1 APPLICATION(S): at 22:00

## 2020-01-01 RX ADMIN — Medication 650 MILLIGRAM(S): at 06:30

## 2020-01-01 RX ADMIN — PANTOPRAZOLE SODIUM 40 MILLIGRAM(S): 20 TABLET, DELAYED RELEASE ORAL at 11:21

## 2020-01-01 RX ADMIN — Medication 40 MILLIGRAM(S): at 05:30

## 2020-01-01 RX ADMIN — Medication 1 APPLICATION(S): at 22:10

## 2020-01-01 RX ADMIN — Medication 50 MILLIGRAM(S): at 02:00

## 2020-01-01 RX ADMIN — Medication 400 MILLIGRAM(S): at 06:23

## 2020-01-01 RX ADMIN — POLYETHYLENE GLYCOL 3350 17 GRAM(S): 17 POWDER, FOR SOLUTION ORAL at 16:35

## 2020-01-01 RX ADMIN — Medication 80 MILLIGRAM(S): at 22:11

## 2020-01-01 RX ADMIN — CHLORHEXIDINE GLUCONATE 15 MILLILITER(S): 213 SOLUTION TOPICAL at 17:29

## 2020-01-01 RX ADMIN — Medication 650 MILLIGRAM(S): at 21:45

## 2020-01-01 RX ADMIN — CHLORHEXIDINE GLUCONATE 15 MILLILITER(S): 213 SOLUTION TOPICAL at 05:22

## 2020-01-01 RX ADMIN — Medication 1 APPLICATION(S): at 19:19

## 2020-01-01 RX ADMIN — POTASSIUM PHOSPHATE, MONOBASIC POTASSIUM PHOSPHATE, DIBASIC 63.75 MILLIMOLE(S): 236; 224 INJECTION, SOLUTION INTRAVENOUS at 10:58

## 2020-01-01 RX ADMIN — CHLORHEXIDINE GLUCONATE 15 MILLILITER(S): 213 SOLUTION TOPICAL at 06:30

## 2020-01-01 RX ADMIN — Medication 40 MILLIEQUIVALENT(S): at 03:38

## 2020-01-01 RX ADMIN — Medication 1 APPLICATION(S): at 14:00

## 2020-01-01 RX ADMIN — AZITHROMYCIN 500 MILLIGRAM(S): 500 TABLET, FILM COATED ORAL at 22:44

## 2020-01-01 RX ADMIN — Medication 1 APPLICATION(S): at 15:46

## 2020-01-01 RX ADMIN — Medication 1 APPLICATION(S): at 02:30

## 2020-01-01 RX ADMIN — PHENYLEPHRINE HYDROCHLORIDE 2.21 MICROGRAM(S)/KG/MIN: 10 INJECTION INTRAVENOUS at 13:14

## 2020-01-01 RX ADMIN — CHLORHEXIDINE GLUCONATE 15 MILLILITER(S): 213 SOLUTION TOPICAL at 06:15

## 2020-01-01 RX ADMIN — FENTANYL CITRATE 100 MICROGRAM(S): 50 INJECTION INTRAVENOUS at 01:50

## 2020-01-01 RX ADMIN — Medication 1 APPLICATION(S): at 05:22

## 2020-01-01 RX ADMIN — Medication 100 MILLIGRAM(S): at 00:12

## 2020-01-01 RX ADMIN — POLYETHYLENE GLYCOL 3350 17 GRAM(S): 17 POWDER, FOR SOLUTION ORAL at 11:01

## 2020-01-01 RX ADMIN — DEXMEDETOMIDINE HYDROCHLORIDE IN 0.9% SODIUM CHLORIDE 7.38 MICROGRAM(S)/KG/HR: 4 INJECTION INTRAVENOUS at 05:23

## 2020-01-01 RX ADMIN — PANTOPRAZOLE SODIUM 40 MILLIGRAM(S): 20 TABLET, DELAYED RELEASE ORAL at 11:23

## 2020-01-01 RX ADMIN — DEXMEDETOMIDINE HYDROCHLORIDE IN 0.9% SODIUM CHLORIDE 7.38 MICROGRAM(S)/KG/HR: 4 INJECTION INTRAVENOUS at 23:00

## 2020-01-01 RX ADMIN — Medication 1 APPLICATION(S): at 11:01

## 2020-01-01 RX ADMIN — Medication 5.53 MICROGRAM(S)/KG/MIN: at 10:25

## 2020-01-01 RX ADMIN — DEXMEDETOMIDINE HYDROCHLORIDE IN 0.9% SODIUM CHLORIDE 7.38 MICROGRAM(S)/KG/HR: 4 INJECTION INTRAVENOUS at 20:08

## 2020-01-01 RX ADMIN — CHLORHEXIDINE GLUCONATE 1 APPLICATION(S): 213 SOLUTION TOPICAL at 07:54

## 2020-01-01 RX ADMIN — PHENYLEPHRINE HYDROCHLORIDE 2.21 MICROGRAM(S)/KG/MIN: 10 INJECTION INTRAVENOUS at 11:11

## 2020-01-01 RX ADMIN — Medication 1 APPLICATION(S): at 11:23

## 2020-01-01 RX ADMIN — PROPOFOL 7.08 MICROGRAM(S)/KG/MIN: 10 INJECTION, EMULSION INTRAVENOUS at 13:13

## 2020-01-01 RX ADMIN — PROPOFOL 7.08 MICROGRAM(S)/KG/MIN: 10 INJECTION, EMULSION INTRAVENOUS at 09:42

## 2020-01-01 RX ADMIN — Medication 1 APPLICATION(S): at 17:53

## 2020-01-01 RX ADMIN — VASOPRESSIN 6 UNIT(S)/MIN: 20 INJECTION INTRAVENOUS at 04:51

## 2020-01-01 RX ADMIN — Medication 1 APPLICATION(S): at 02:40

## 2020-01-01 RX ADMIN — Medication 40 MILLIEQUIVALENT(S): at 11:23

## 2020-01-01 RX ADMIN — MIDAZOLAM HYDROCHLORIDE 1.18 MG/KG/HR: 1 INJECTION, SOLUTION INTRAMUSCULAR; INTRAVENOUS at 23:57

## 2020-01-01 RX ADMIN — MIDAZOLAM HYDROCHLORIDE 1.18 MG/KG/HR: 1 INJECTION, SOLUTION INTRAMUSCULAR; INTRAVENOUS at 17:11

## 2020-01-01 RX ADMIN — Medication 80 MILLIGRAM(S): at 23:17

## 2020-01-01 RX ADMIN — PHENYLEPHRINE HYDROCHLORIDE 2.21 MICROGRAM(S)/KG/MIN: 10 INJECTION INTRAVENOUS at 07:11

## 2020-01-01 RX ADMIN — Medication 1 APPLICATION(S): at 23:35

## 2020-01-01 RX ADMIN — Medication 1 APPLICATION(S): at 09:17

## 2020-01-01 RX ADMIN — Medication 1 APPLICATION(S): at 17:13

## 2020-01-01 RX ADMIN — ETOMIDATE 20 MILLIGRAM(S): 2 INJECTION INTRAVENOUS at 00:12

## 2020-01-01 RX ADMIN — PROPOFOL 7.08 MICROGRAM(S)/KG/MIN: 10 INJECTION, EMULSION INTRAVENOUS at 08:46

## 2020-01-01 RX ADMIN — Medication 400 MILLIGRAM(S): at 00:00

## 2020-01-01 RX ADMIN — Medication 200 MILLIGRAM(S): at 06:31

## 2020-01-01 RX ADMIN — CISATRACURIUM BESYLATE 10.6 MICROGRAM(S)/KG/MIN: 2 INJECTION INTRAVENOUS at 19:04

## 2020-01-01 RX ADMIN — Medication 80 MILLIGRAM(S): at 05:00

## 2020-01-01 RX ADMIN — Medication 650 MILLIGRAM(S): at 22:11

## 2020-03-25 NOTE — ED PROVIDER NOTE - NS_ ATTENDINGSCRIBEDETAILS _ED_A_ED_FT
I, Jemal Welch MD,  performed the initial face to face bedside interview with this patient regarding history of present illness, review of symptoms and relevant past medical, social and family history.  I completed an independent physical examination.    The history, relevant review of systems, past medical and surgical history, medical decision making, and physical examination was documented by the scribe in my presence and I attest to the accuracy of the documentation.

## 2020-03-25 NOTE — ED PROVIDER NOTE - CARE PLAN
Principal Discharge DX:	Acute respiratory failure, unspecified whether with hypoxia or hypercapnia  Secondary Diagnosis:	Viral pneumonia

## 2020-03-25 NOTE — CONSULT NOTE ADULT - SUBJECTIVE AND OBJECTIVE BOX
REASON FOR CONSULT: Respiratory Failure    Chief Complaint- SOB    HPI:  · Chief Complaint: The patient is a 44y Male complaining of shortness of breath.	  · HPI Objective Statement: 45 y/o male with a PMHx of HLD presents to the ED c/o dyspnea x1 week and cough x3 days. Pt states he had a fever 3 days ago but it went away and has not needed to take medications. Pt reports today he became severely SOB with increasing cough and sore throat today so came to ED. No known sick contacts. No other complaints at this time. +hypoxic on arrival. NKDA. No PMD.	  · Presenting Symptoms: COUGH, FEVER, SHORTNESS OF BREATH, +sore throat +hypoxic	  · Negative Findings: no chest pain	  · Timing: gradual onset	  · Duration: week(s)  1	  · Aggravated Factors: none	  · Relieving Factors: none	  At the time of consultation the patient was with a respiratory rate in the 40s with immediate desaturation without the non rebreather mask.    PAST MEDICAL & SURGICAL HISTORY:  HLD (hyperlipidemia)  No significant past surgical history    Allergies    No Known Allergies    FAMILY HISTORY:  No pertinent family history in first degree relatives    Review of Systems: as per ED notes.    CONSTITUTIONAL: No fever, chills, or fatigue  EYES: No eye pain, visual disturbances, or discharge  ENMT:  No difficulty hearing, tinnitus, vertigo; No sinus or throat pain  NECK: No pain or stiffness  RESPIRATORY: No cough, wheezing, chills or hemoptysis; No shortness of breath  CARDIOVASCULAR: No chest pain, palpitations, dizziness, or leg swelling  GASTROINTESTINAL: No abdominal or epigastric pain. No nausea, vomiting, or hematemesis; No diarrhea or constipation. No melena or hematochezia.  GENITOURINARY: No dysuria, frequency, hematuria, or incontinence  NEUROLOGICAL: No headaches, memory loss, loss of strength, numbness, or tremors  SKIN: No itching, burning, rashes, or lesions   MUSCULOSKELETAL: No joint pain or swelling; No muscle, back, or extremity pain  PSYCHIATRIC: No depression, anxiety, mood swings, or difficulty sleeping      Medications:  potassium chloride    Tablet ER 40 milliequivalents Oral once        Vital Signs Last 24 Hrs  T(C): 37.8 (25 Mar 2020 21:34), Max: 37.8 (25 Mar 2020 21:34)  T(F): 100.1 (25 Mar 2020 21:34), Max: 100.1 (25 Mar 2020 21:34)  HR: 95 (25 Mar 2020 22:45) (95 - 112)  BP: 121/80 (25 Mar 2020 22:45) (120/86 - 121/80)  BP(mean): 97 (25 Mar 2020 21:34) (97 - 97)  RR: 40 (25 Mar 2020 22:45) (30 - 40)  SpO2: 98% (25 Mar 2020 22:45) (95% - 98%) on rebreather       LABS:                        17.2   5.83  )-----------( 179      ( 25 Mar 2020 21:39 )             48.1     03-25    133<L>  |  98  |  17  ----------------------------<  116<H>  2.8<LL>   |  28  |  1.08    Ca    8.2<L>      25 Mar 2020 21:39    TPro  7.8  /  Alb  3.0<L>  /  TBili  0.8  /  DBili  x   /  AST  114<H>  /  ALT  25  /  AlkPhos  100  03-25          CAPILLARY BLOOD GLUCOSE        PT/INR - ( 25 Mar 2020 21:39 )   PT: 11.7 sec;   INR: 1.05 ratio         PTT - ( 25 Mar 2020 21:39 )  PTT:29.5 sec    CULTURES:      Physical Examination:  Physical exam as per bedside MD (direct physical examination could not be performed because the visit was provided via Telemedicine):       RADIOLOGY: CXR- diffuse patchy infiltrates, increased COR    Assessment- Respiratory failure with probable COVID19    Plan- Intubation     Ventilation     COVID protocol     Case discussed with the ICU PA    CRITICAL CARE TIME SPENT: 60    This visit was provided via telemedicine. Patient was located at     [facility]    Provider was located at [employer] [facility] [place] [place] for the visit.

## 2020-03-25 NOTE — ED ADULT NURSE NOTE - OBJECTIVE STATEMENT
Pt presents to ed, patient suspected COVID19 PT, sepsis workup initiated, patient a+o,, 2x blood cultures drawn, EKG done, VSS, swabbed for C19 / RVP Pt stable at this time. will continue to monitor. Pt satting 95% on NRB MD AWARE

## 2020-03-25 NOTE — ED ADULT NURSE REASSESSMENT NOTE - NS ED NURSE REASSESS COMMENT FT1
sats labile, patient still tachypneic, placed back on NRB, holding PO K md wick aware just incase of intubation.

## 2020-03-25 NOTE — ED ADULT NURSE NOTE - DOES PATIENT HAVE ADVANCE DIRECTIVE
Medication(s) Requested:   zolpidem (AMBIEN) 10 MG tablet  Take 1 tablet by mouth at bedtime as needed for Sleep. - Oral  #15 with 3 refills    DIAZepam (VALIUM) 2 MG tablet  Take 1/2 tablet by mouth daily  #15 with 1 refill    Last office visit: 11/15/18  Next office visit : 02/12/19  Last refill: 11/15/18 per epic and error alerts / flagged account per PDMP    Is the patient due for refill of this medication(s): No PDMP Bad .  PDMP review: Criteria not met because PDMP flagged. Forwarded to Physician/HERMINIO for further review and decision on medication(s) requested.      No

## 2020-03-25 NOTE — ED PROVIDER NOTE - OBJECTIVE STATEMENT
45 y/o male with a PMHx of HLD presents to the ED c/o dyspnea x1 week and cough x3 days. Pt states he had a fever 3 days ago but it went away and has not needed to take medications. Pt reports today he became severely SOB with increasing cough and sore throat today so came to ED. No known sick contacts. No other complaints at this time. +hypoxic on arrival. NKDA. No PMD.

## 2020-03-26 NOTE — PROCEDURE NOTE - NSINFORMCONSENT_GEN_A_CORE
This was an emergent procedure.
Discussed with Dr. Jeter - Intensivist/This was an emergent procedure.

## 2020-03-26 NOTE — DIETITIAN INITIAL EVALUATION ADULT. - PHYSICAL APPEARANCE
other (specify) Skin intact with no edema documented. Tejas score-12 (high risk for skin breakdown)  Unable to perform NFPE due to isolation protocol.

## 2020-03-26 NOTE — CHART NOTE - NSCHARTNOTEFT_GEN_A_CORE
I called and s/w pt's next of kin/emergency contact, his cousin, Sarwat [phone], via , Maude #[id number].  He was unaware that pt is in the hospital.  He has been exposed to pt but currently not having symptoms.  I explained to self-quarantine and if he has any symptoms of fever/sob/cough/sore throat, to come to ED.

## 2020-03-26 NOTE — DIETITIAN INITIAL EVALUATION ADULT. - OTHER INFO
45 y/o male with a PMHx of HLD presents to the ED c/o dyspnea x1 week and cough x3 days. Pt states he had a fever 3 days ago but it went away and has not needed to take medications. Pt reports he became severely SOB with increasing cough and sore throat today so came to ED.Pt admitted to the ICU for acute hypoxic respiratory failure, COVID r/o, ARDS, 45 y/o male with a PMHx of HLD presents to the ED c/o dyspnea x1 week and cough x3 days. Pt states he had a fever 3 days ago but it went away and has not needed to take medications. Pt reports he became severely SOB with increasing cough and sore throat today so came to ED.Pt admitted to the ICU for acute hypoxic respiratory failure, COVID r/o, ARDS. Pt vented, sedated, and on paralytics. PEEP -16. No IVF noted. Research indicates patients with extended length of inadequate nutrition are at higher risk for mortality, longer length of stay and ventilation, with increased risk of pressure injuries. To initiate enteral feeds with recommendations below. Sedation propofol 19.47ml/hr providing additional 515 calories which will be included in energy needs. Will follow up prn. 45 y/o male with a PMHx of HLD presents to the ED c/o dyspnea x1 week and cough x3 days. Pt states he had a fever 3 days ago but it went away and has not needed to take medications. Pt reports he became severely SOB with increasing cough and sore throat today so came to ED.Pt admitted to the ICU for acute hypoxic respiratory failure, COVID r/o, ARDS, 45 y/o male with a PMHx of HLD presents to the ED c/o dyspnea x1 week and cough x3 days. Pt states he had a fever 3 days ago but it went away and has not needed to take medications. Pt reports he became severely SOB with increasing cough and sore throat today so came to ED.Pt admitted to the ICU for acute hypoxic respiratory failure, COVID r/o, ARDS. Pt vented, sedated, and on paralytics. PEEP -16. No IVF noted. Research indicates patients with extended length of inadequate nutrition are at higher risk for mortality, longer length of stay and ventilation, with increased risk of pressure injuries. To initiate enteral feeds with recommendations below. Sedation propofol 19.47ml/hr providing additional 515 calories which will be included in energy needs. Monitor labs/lytes replete as needed. Monitor strict I & O's . Will follow up prn.

## 2020-03-26 NOTE — DIETITIAN INITIAL EVALUATION ADULT. - ENTERAL
1) Vital 1.5 @ 95ml/hr  x 6 hours + 7 packs of prosource TF (during supine position) providing including propofol calories- 1422 calories/ 115gm protein, 435ml) . 2) Additional free water flush @ 95ml/hr x 6 hours (total volume 570ml). Daily fluid including free water from formula will provide ~1000ml/24 hr

## 2020-03-26 NOTE — ED ADULT NURSE REASSESSMENT NOTE - NS ED NURSE REASSESS COMMENT FT1
patient given 20 etomidate / 100 succ @ 0012, patient tubed with 7.5 et tube, secured at 23@ the lip, + color change, 50 rocuronium given at 0014. Pt VSS will CTM patient given 20 etomidate / 100 succ @ 0012, patient tubed with 7.5 et tube, secured at 23@ the lip, + color change, 50 rocuronium given at 0014. Pt VSS will CTM    16 FR cormier / OG tube placed, verified via chest xray. Procedure explained to PT prior to intubation by Anesthesiologist, Lu  Kacie, patient agreed to procedure    patient given 20 etomidate / 100 succ @ 0012, patient tubed with 7.5 et tube, secured at 23@ the lip, + color change, 50 rocuronium given at 0014. Pt VSS will CTM    16 FR cormier / OG tube placed, verified via chest xray.

## 2020-03-26 NOTE — PROCEDURE NOTE - NSPOSTCAREGUIDE_GEN_A_CORE
Instructed patient/caregiver regarding signs and symptoms of infection/Care for catheter as per unit/ICU protocols/Verbal/written post procedure instructions were given to patient/caregiver/Instructed patient/caregiver to follow-up with primary care physician/Keep the cast/splint/dressing clean and dry
Care for catheter as per unit/ICU protocols

## 2020-03-26 NOTE — H&P ADULT - ASSESSMENT
Assessment/Plan/Therapeutic interventions      Neuro: Sedation with neuromuscular blockade (as needed) to facilitate safe ventilation    Cor:  Pressor support as needed to maintain MAP 65.  Avoiding fluid challenges.  QTC monitoring while on azithro and      hydroxychloroquine.    Pulm:  ARDS-NET 4-6cc/kg IBW TV as able to maintain plateau pressures <30. Prone ventilation consideration as feasable.  Pa02/Fi02 < 150 on Fi02 >60% and PEEP at least 5.    GI:  PPI  Enteric feeds as tolerated in tandem with NMB and prone ventilation    Renal: Even to negative fluid balance as tolerated by hemodynamics and renal fx.  Feeds to be provided in lieu of IVF.     Heme:  Pharmacologic DVT P in addition to SCD's    ID: ABX discontinuation based on discussion with ID in conjunction with clinical features, culture data, and judicious procalcitonin monitoring.      Endo Aggressive glycemic control to limit FS glucose to < 180mg/dl.        COVID 19 specific considerations and therapeutic options based on the available and rapidly changing literature    Goals of care considerations:  Ongoing assessment for patient specific treatment options based on progression or decline.  I have involved the family with updates and requests in guidance for medical decision making.      38 minutes of critical care time spent in the management of this critically ill COVID-19 patient/PUI patient with continuous assessments and interventions based on the interpretation of multiple databases. Assessment/Plan/Therapeutic interventions  43 y/o male with a PMHx of HLD presents to the ED c/o dyspnea x1 week and cough x3 days. Pt states he had a fever 3 days ago but it went away and has not needed to take medications. Pt reports today he became severely SOB with increasing cough and sore throat today so came to ED.Pt admitted to the ICU for acute hypoxic respiratory failure, COVID r/o, RUFUS, ARDS,     Neuro: Sedation with neuromuscular blockade (as needed) to facilitate safe ventilation    Cor:  Pressor support as needed to maintain MAP 65.  Avoiding fluid challenges.  QTC monitoring while on azithro and      hydroxychloroquine.    Pulm:  ARDS-NET 4-6cc/kg IBW TV as able to maintain plateau pressures <30. Prone ventilation consideration as feasable.  Pa02/Fi02 < 150 on Fi02 >60% and PEEP at least 5.    GI:  PPI  Enteric feeds as tolerated in tandem with NMB and prone ventilation    Renal: Even to negative fluid balance as tolerated by hemodynamics and renal fx.  Feeds to be provided in lieu of IVF.     Heme:  Pharmacologic DVT P in addition to SCD's    ID: ABX discontinuation based on discussion with ID in conjunction with clinical features, culture data, and judicious procalcitonin monitoring.      Endo Aggressive glycemic control to limit FS glucose to < 180mg/dl.        COVID 19 specific considerations and therapeutic options based on the available and rapidly changing literature    Goals of care considerations:  Ongoing assessment for patient specific treatment options based on progression or decline.  I have involved the family with updates and requests in guidance for medical decision making.      38 minutes of critical care time spent in the management of this critically ill COVID-19 patient/PUI patient with continuous assessments and interventions based on the interpretation of multiple databases.

## 2020-03-26 NOTE — PROCEDURE NOTE - NSPROCDETAILS_GEN_ALL_CORE
sutured in place/location identified, draped/prepped, sterile technique used, needle inserted/introduced/connected to a pressurized flush line/hemostasis with direct pressure, dressing applied/Seldinger technique/positive blood return obtained via catheter/all materials/supplies accounted for at end of procedure/ultrasound guidance
sterile dressing applied/ultrasound guidance/sterile technique, catheter placed/guidewire recovered/lumen(s) aspirated and flushed

## 2020-03-26 NOTE — PROCEDURE NOTE - NSINDICATIONS_GEN_A_CORE
critical patient/cannulation purposes/monitoring purposes/arterial puncture to obtain ABG's/blood sampling

## 2020-03-26 NOTE — DIETITIAN INITIAL EVALUATION ADULT. - PERTINENT MEDS FT
MEDICATIONS  (STANDING):  chlorhexidine 0.12% Liquid 15 milliLiter(s) Oral Mucosa every 12 hours  chlorhexidine 4% Liquid 1 Application(s) Topical <User Schedule>  cisatracurium Infusion 3 MICROgram(s)/kG/Min (10.6 mL/Hr) IV Continuous <Continuous>  enoxaparin Injectable 40 milliGRAM(s) SubCutaneous daily  fentaNYL   Infusion. 0.5 MICROgram(s)/kG/Hr (2.95 mL/Hr) IV Continuous <Continuous>  hydroxychloroquine 400 milliGRAM(s) Oral two times a day  hydroxychloroquine   Oral   midazolam Infusion 0.02 mG/kG/Hr (1.18 mL/Hr) IV Continuous <Continuous>  pantoprazole  Injectable 40 milliGRAM(s) IV Push daily  petrolatum Ophthalmic Ointment 1 Application(s) Both EYES every 4 hours  phenylephrine    Infusion 0.1 MICROgram(s)/kG/Min (2.21 mL/Hr) IV Continuous <Continuous>  propofol Infusion 20 MICROgram(s)/kG/Min (7.08 mL/Hr) IV Continuous <Continuous>    MEDICATIONS  (PRN):  acetaminophen   Tablet .. 650 milliGRAM(s) Oral every 6 hours PRN Temp greater or equal to 38C (100.4F), Mild Pain (1 - 3)

## 2020-03-26 NOTE — AIRWAY PLACEMENT NOTE ADULT - POST AIRWAY PLACEMENT ASSESSMENT:
positive end tidal CO2 noted/breath sounds equal/chest excursion noted/ETCO2 Confirmation/breath sounds bilateral/CXR pending

## 2020-03-26 NOTE — ED ADULT NURSE REASSESSMENT NOTE - NS ED NURSE REASSESS COMMENT FT1
patient waking up from sedation ICU PA notified, orders put in, patient sedated, report given to Kelsey, patient transported to CCU on vent.

## 2020-03-26 NOTE — H&P ADULT - HISTORY OF PRESENT ILLNESS
HPI:      Hosp day #  Vent day #  Vent settings: HPI:  43 y/o male with a PMHx of HLD presents to the ED c/o dyspnea x1 week and cough x3 days. Pt states he had a fever 3 days ago but it went away and has not needed to take medications. Pt reports today he became severely SOB with increasing cough and sore throat today so came to ED.Pt admitted to the ICU for acute hypoxic respiratory failure, COVID r/o, RUFUS, ARDS,     Hosp day #1  Vent day #1  Vent settings: 400/24/10/100

## 2020-03-26 NOTE — PROGRESS NOTE ADULT - SUBJECTIVE AND OBJECTIVE BOX
Seen in the CCU    HPI:  43 y/o male with a PMHx of HLD presents to the ED c/o dyspnea x1 week and cough x3 days. Pt states he had a fever 3 days ago but it went away and has not needed to take medications. Pt reports he became severely SOB with increasing cough and sore throat today so came to ED.Pt admitted to the ICU for acute hypoxic respiratory failure, COVID r/o, ARDS,     3/26: Patient seen vented, Hypotensive, deeply sedated and paralyzed.         PAST MEDICAL & SURGICAL HISTORY:  HLD (hyperlipidemia)  No significant past surgical history      FAMILY HISTORY:  No pertinent family history in first degree relatives      Social Hx:    Allergies    No Known Allergies    Intolerances        Height (cm): 160.02 ( @ 21:24)  Weight (kg): 59 ( @ 21:24)  BMI (kg/m2): 23 ( @ 21:24)    ICU Vital Signs Last 24 Hrs  T(C): 37.9 (26 Mar 2020 09:05), Max: 37.9 (26 Mar 2020 09:05)  T(F): 100.2 (26 Mar 2020 09:05), Max: 100.2 (26 Mar 2020 09:05)  HR: 101 (26 Mar 2020 11:17) (92 - 113)  BP: 91/47 (26 Mar 2020 10:00) (91/47 - 168/104)  BP(mean): 56 (26 Mar 2020 10:00) (56 - 102)  ABP: --  ABP(mean): --  RR: 24 (26 Mar 2020 10:00) (24 - 40)  SpO2: 100% (26 Mar 2020 11:17) (93% - 100%)      Mode: AC/ CMV (Assist Control/ Continuous Mandatory Ventilation)  RR (machine): 24  TV (machine): 400  FiO2: 80  PEEP: 16  ITime: 1  MAP: 23  PIP: 33      I&O's Summary                            14.5   7.57  )-----------( 143      ( 26 Mar 2020 07:10 )             41.8           140  |  106  |  13  ----------------------------<  100<H>  3.6   |  29  |  0.69    Ca    7.2<L>      26 Mar 2020 07:10  Phos  4.5     03-  Mg     2.4     03-    TPro  6.1  /  Alb  2.5<L>  /  TBili  0.4  /  DBili  0.2  /  AST  92<H>  /  ALT  19  /  AlkPhos  74  03-26      CARDIAC MARKERS ( 26 Mar 2020 07:10 )  <0.015 ng/mL / x     / 298 U/L / x     / x            ABG - ( 26 Mar 2020 08:31 )  pH, Arterial: 7.24  pH, Blood: x     /  pCO2: 43    /  pO2: 175   / HCO3: 18    / Base Excess: -8.7  /  SaO2: 99                  Urinalysis Basic - ( 26 Mar 2020 07:00 )    Color: Yellow / Appearance: very cloudy / S.025 / pH: x  Gluc: x / Ketone: Negative  / Bili: Negative / Urobili: Negative mg/dL   Blood: x / Protein: 100 mg/dL / Nitrite: Negative   Leuk Esterase: Negative / RBC: 6-10 /HPF / WBC 0-2   Sq Epi: x / Non Sq Epi: Negative / Bacteria: Moderate        MEDICATIONS  (STANDING):  chlorhexidine 0.12% Liquid 15 milliLiter(s) Oral Mucosa every 12 hours  chlorhexidine 4% Liquid 1 Application(s) Topical <User Schedule>  cisatracurium Infusion 3 MICROgram(s)/kG/Min (10.6 mL/Hr) IV Continuous <Continuous>  enoxaparin Injectable 40 milliGRAM(s) SubCutaneous daily  fentaNYL   Infusion. 0.5 MICROgram(s)/kG/Hr (2.95 mL/Hr) IV Continuous <Continuous>  hydroxychloroquine 400 milliGRAM(s) Oral two times a day  hydroxychloroquine   Oral   midazolam Infusion 0.02 mG/kG/Hr (1.18 mL/Hr) IV Continuous <Continuous>  pantoprazole  Injectable 40 milliGRAM(s) IV Push daily  petrolatum Ophthalmic Ointment 1 Application(s) Both EYES every 4 hours  phenylephrine    Infusion 0.1 MICROgram(s)/kG/Min (2.21 mL/Hr) IV Continuous <Continuous>  propofol Infusion 20 MICROgram(s)/kG/Min (7.08 mL/Hr) IV Continuous <Continuous>    MEDICATIONS  (PRN):  acetaminophen   Tablet .. 650 milliGRAM(s) Oral every 6 hours PRN Temp greater or equal to 38C (100.4F), Mild Pain (1 - 3)      DVT Prophylaxis: Lovenox    Advanced Directives:  Discussed with:    Visit Information: 95 min    ** Time is exclusive of billed procedures and/or teaching and/or routine family updates.

## 2020-03-26 NOTE — DIETITIAN INITIAL EVALUATION ADULT. - ADD RECOMMEND
1) initiate enteral feeds as recommended. 2) Maintain aspiration precautions, back of bed >35 degrees. 3) Add prokinetic agent when enteral feeds started to decrease risk of aspiration and emesis. 4) Add Miralax for bowel regimen and advance to Lact 1) Maintain aspiration precautions, back of bed >35 degrees. 3) Add prokinetic agent when enteral feeds started to decrease risk of aspiration and emesis. 4) Add Miralax for bowel regimen and advance to Lactulose if pt remains constipated. After initial bowel movement reevaluate for bowel regimen prn. 5) Complete enteral feeds at least 1 hour prior to resuming prone position. If < 1 hour recommend evacuating stomach to decrease risk of Aspiration. . 1) Maintain aspiration precautions, back of bed >35 degrees. 2) Add prokinetic agent when enteral feeds are started to decrease risk of aspiration and emesis. 4) Add Miralax for bowel regimen and advance to Lactulose if pt remains constipated. After initial bowel movement reevaluate for bowel regimen prn. 5) Complete enteral feeds at least 1 hour prior to resuming prone position. If < 1 hour recommend evacuating stomach to decrease risk of Aspiration. .

## 2020-03-26 NOTE — DIETITIAN INITIAL EVALUATION ADULT. - PERTINENT LABORATORY DATA
03-26 Na140 mmol/L Glu 100 mg/dL<H> K+ 3.6 mmol/L Cr  0.69 mg/dL BUN 13 mg/dL Phos 4.5 mg/dL Alb 2.5 g/dL<L> PAB n/a

## 2020-03-27 NOTE — PROGRESS NOTE ADULT - SUBJECTIVE AND OBJECTIVE BOX
HPI: 45 y/o male with a PMHx of HLD, who presented to the ED c/o dyspnea x1 week and cough x3 days, with increasing SOB, cough, and sore throat. Pt admitted to the ICU for acute hypoxic respiratory failure, COVID 19 Positive, severe respiratory acidosis, RUFUS, ARDS, septic shock      Hosp day # 2  Vent day # 2  pF ratio: 236    Oxygenation improving, respiratory acidosis improving  ABG 7.33/48/142/25  FiO2 weaned 60 --> 40%, PEEP +16  Sedated on Propofol/Fentanyl/Versed, Paralyzed with Nimbex  Shock on Phenylephrine  Hydroxychloroquine/Azithromycin        Vital signs reviewed and physical exam performed where pertinent and urgently required.    Lab/radiology studies/ABG/meds reviewed and interpreted into the assessment and treatment plan.        Assessment/Plan/Therapeutic interventions:    Neuro: Deep sedation and neuromuscular blockade to facilitate ventilation and improve compliance to optimize oxygenation    CV: Pressor support to maintain MAP >65. Monitoring end points of perfusion. QTC monitoring while on azithro and hydroxychloroquine.    Pulm:  Intubated on full vent support, will consider decreasing Tv to 460cc in AM now that respiratory acidosis has resolved per ARDS-NET guidelines low Tv strategy 4-8 cc/kg IBW as able to maintain plateau pressures <30. High PEEP, low FiO2 strategy per ARDSnet, weaning FiO2 to keep paO2 55-80. Oxygenation improving, does not require prone ventilation at this time.     GI:  PPI. Will start enteric bolus feeds.    Renal: Euvolemic to net negative fluid balance as tolerated by hemodynamics and renal fx.  Feeds to be provided in lieu of IVF. Will diurese as needed to keep goal net negative fluid balance in ARDS, net positive 1L, may give Lasix challenge in AM.     Heme:  Pharmacologic DVT ppx in addition to SCDs    ID: ABX discontinuation based on discussion with ID in conjunction with clinical features, culture data, and judicious procalcitonin monitoring.  Trending fever curve.    Endo Aggressive glycemic control to limit FS glucose to < 180mg/dl while critically ill.         COVID 19 specific considerations and therapeutic options based on the available and rapidly changing literature    Goals of care considerations:  Ongoing assessment for patient specific treatment options based on progression or decline.  I have involved the family with updates and requests in guidance for medical decision making as able.      36 minutes of critical care time spent in the management of this critically ill COVID-19 patient/PUI patient suffering from multisystem organ failure with continuous assessments and interventions based on the interpretation of multiple databases.

## 2020-03-27 NOTE — PROGRESS NOTE ADULT - SUBJECTIVE AND OBJECTIVE BOX
Seen in the CCU    HPI:  43 y/o male with a PMHx of HLD presents to the ED c/o dyspnea x1 week and cough x3 days. Pt states he had a fever 3 days ago but it went away and has not needed to take medications. Pt reports he became severely SOB with increasing cough and sore throat today so came to ED.Pt admitted to the ICU for acute hypoxic respiratory failure, COVID r/o, ARDS,     3/26: Patient seen vented, Hypotensive, deeply sedated and paralyzed.    3/27: He remains deeply sedates and paralyzed.  On 40% PEEP 14 now      PAST MEDICAL & SURGICAL HISTORY:  HLD (hyperlipidemia)  No significant past surgical history      FAMILY HISTORY:  No pertinent family history in first degree relatives      Social Hx:    Allergies    No Known Allergies    Intolerances            ICU Vital Signs Last 24 Hrs  T(C): 37.8 (27 Mar 2020 12:00), Max: 38.9 (27 Mar 2020 06:03)  T(F): 100 (27 Mar 2020 12:00), Max: 102 (27 Mar 2020 06:03)  HR: 97 (27 Mar 2020 12:00) (81 - 99)  BP: 92/53 (27 Mar 2020 11:45) (70/39 - 179/94)  BP(mean): 62 (27 Mar 2020 11:45) (45 - 116)  ABP: 93/54 (27 Mar 2020 12:00) (65/40 - 204/99)  ABP(mean): 68 (27 Mar 2020 12:00) (51 - 143)  RR: 24 (27 Mar 2020 12:00) (24 - 24)  SpO2: 92% (27 Mar 2020 12:00) (88% - 100%)      Mode: AC/ CMV (Assist Control/ Continuous Mandatory Ventilation)  RR (machine): 24  TV (machine): 500  FiO2: 40  PEEP: 14  ITime: 1  MAP: 24  PIP: 33      I&O's Summary    26 Mar 2020 07:01  -  27 Mar 2020 07:00  --------------------------------------------------------  IN: 3254.1 mL / OUT: 950 mL / NET: 2304.1 mL    27 Mar 2020 07:01  -  27 Mar 2020 12:10  --------------------------------------------------------  IN: 417.5 mL / OUT: 870 mL / NET: -452.5 mL                              13.4   19.62 )-----------( 210      ( 27 Mar 2020 05:30 )             39.6       0327    140  |  109<H>  |  18  ----------------------------<  132<H>  4.2   |  26  |  1.14    Ca    7.6<L>      27 Mar 2020 05:30  Phos  2.4       Mg     2.3         TPro  5.9<L>  /  Alb  1.9<L>  /  TBili  2.1<H>  /  DBili  x   /  AST  97<H>  /  ALT  19  /  AlkPhos  88  27      CARDIAC MARKERS ( 26 Mar 2020 07:10 )  <0.015 ng/mL / x     / 298 U/L / x     / x            ABG - ( 27 Mar 2020 04:19 )  pH, Arterial: 7.30  pH, Blood: x     /  pCO2: 50    /  pO2: 92    / HCO3: 24    / Base Excess: -2.6  /  SaO2: 96                  Urinalysis Basic - ( 26 Mar 2020 07:00 )    Color: Yellow / Appearance: very cloudy / S.025 / pH: x  Gluc: x / Ketone: Negative  / Bili: Negative / Urobili: Negative mg/dL   Blood: x / Protein: 100 mg/dL / Nitrite: Negative   Leuk Esterase: Negative / RBC: 6-10 /HPF / WBC 0-2   Sq Epi: x / Non Sq Epi: Negative / Bacteria: Moderate        MEDICATIONS  (STANDING):  azithromycin   Tablet 250 milliGRAM(s) Oral every 24 hours  chlorhexidine 0.12% Liquid 15 milliLiter(s) Oral Mucosa every 12 hours  chlorhexidine 4% Liquid 1 Application(s) Topical <User Schedule>  cisatracurium Infusion 3 MICROgram(s)/kG/Min (10.6 mL/Hr) IV Continuous <Continuous>  enoxaparin Injectable 40 milliGRAM(s) SubCutaneous daily  fentaNYL   Infusion. 0.5 MICROgram(s)/kG/Hr (2.95 mL/Hr) IV Continuous <Continuous>  hydroxychloroquine 200 milliGRAM(s) Oral every 12 hours  hydroxychloroquine   Oral   midazolam Infusion 0.02 mG/kG/Hr (1.18 mL/Hr) IV Continuous <Continuous>  norepinephrine Infusion 0.05 MICROgram(s)/kG/Min (5.53 mL/Hr) IV Continuous <Continuous>  pantoprazole  Injectable 40 milliGRAM(s) IV Push daily  petrolatum Ophthalmic Ointment 1 Application(s) Both EYES every 4 hours  phenylephrine    Infusion 0.1 MICROgram(s)/kG/Min (2.21 mL/Hr) IV Continuous <Continuous>  polyethylene glycol 3350 17 Gram(s) Oral daily  pravastatin 80 milliGRAM(s) Oral at bedtime  propofol Infusion 20 MICROgram(s)/kG/Min (7.08 mL/Hr) IV Continuous <Continuous>    MEDICATIONS  (PRN):  acetaminophen   Tablet .. 650 milliGRAM(s) Oral every 6 hours PRN Temp greater or equal to 38C (100.4F), Mild Pain (1 - 3)      DVT Prophylaxis: Lovenox    Advanced Directives:  Discussed with:    Visit Information: 45 min    ** Time is exclusive of billed procedures and/or teaching and/or routine family updates.

## 2020-03-27 NOTE — PROVIDER CONTACT NOTE (CHANGE IN STATUS NOTIFICATION) - SITUATION
Pt noted to have tube feeds in mouth and in bed. Feeds turned off. no residual in tube. OGT placement checked, verified by audible air. PA made aware.

## 2020-03-28 NOTE — PROVIDER CONTACT NOTE (CHANGE IN STATUS NOTIFICATION) - ASSESSMENT
At 0130 pt noted to drop O2sat to 83%. Pt with residual tube feeds in mouth, moderate amount oral suction. Pt with increased coughing, requiring increase in foi2 to 50%. Also requiring increased sedation.

## 2020-03-28 NOTE — PROGRESS NOTE ADULT - SUBJECTIVE AND OBJECTIVE BOX
HPI:  43 y/o male with a PMHx of HLD presents to the ED c/o dyspnea x1 week and cough x3 days. Pt states he had a fever 3 days ago but it went away and has not needed to take medications. Pt reports he became severely SOB with increasing cough and sore throat today so came to ED.Pt admitted to the ICU for acute hypoxic respiratory failure, COVID r/o, ARDS,     3/26: Patient seen vented, Hypotensive, deeply sedated and paralyzed.    3/27: He remains deeply sedates and paralyzed.  On 40% PEEP 14 now  3/28: Vented deeply sedated and on cis.  Decreasing PEEP to 10, Vomiting       PAST MEDICAL & SURGICAL HISTORY:  HLD (hyperlipidemia)  No significant past surgical history      FAMILY HISTORY:  No pertinent family history in first degree relatives      Social Hx:    Allergies    No Known Allergies    Intolerances            ICU Vital Signs Last 24 Hrs  T(C): 37.4 (28 Mar 2020 08:00), Max: 38.1 (28 Mar 2020 05:00)  T(F): 99.3 (28 Mar 2020 08:00), Max: 100.5 (28 Mar 2020 05:00)  HR: 93 (28 Mar 2020 10:15) (81 - 101)  BP: 124/66 (28 Mar 2020 10:15) (76/45 - 179/94)  BP(mean): 79 (28 Mar 2020 10:15) (52 - 116)  ABP: 116/67 (28 Mar 2020 10:15) (69/46 - 204/99)  ABP(mean): 90 (28 Mar 2020 10:15) (57 - 143)  RR: 24 (28 Mar 2020 10:15) (24 - 24)  SpO2: 92% (28 Mar 2020 10:15) (87% - 100%)      Mode: AC/ CMV (Assist Control/ Continuous Mandatory Ventilation)  RR (machine): 24  TV (machine): 500  FiO2: 40  PEEP: 10  ITime: 1  MAP: 20  PIP: 30      I&O's Summary    27 Mar 2020 07:01  -  28 Mar 2020 07:00  --------------------------------------------------------  IN: 1477.5 mL / OUT: 2630 mL / NET: -1152.5 mL    28 Mar 2020 07:01  -  28 Mar 2020 10:34  --------------------------------------------------------  IN: 382 mL / OUT: 600 mL / NET: -218 mL                              12.8   12.20 )-----------( 236      ( 28 Mar 2020 05:00 )             37.8       03-28    140  |  105  |  10  ----------------------------<  139<H>  3.2<L>   |  31  |  0.71    Ca    7.9<L>      28 Mar 2020 05:00  Phos  0.9     03-28  Mg     2.2     03-28    TPro  6.0  /  Alb  1.7<L>  /  TBili  1.3<H>  /  DBili  x   /  AST  87<H>  /  ALT  17  /  AlkPhos  101  03-28            ABG - ( 28 Mar 2020 04:37 )  pH, Arterial: 7.42  pH, Blood: x     /  pCO2: 48    /  pO2: 80    / HCO3: 30    / Base Excess: 5.2   /  SaO2: 96                      MEDICATIONS  (STANDING):  azithromycin   Tablet 250 milliGRAM(s) Oral every 24 hours  chlorhexidine 0.12% Liquid 15 milliLiter(s) Oral Mucosa every 12 hours  chlorhexidine 4% Liquid 1 Application(s) Topical <User Schedule>  cisatracurium Infusion 3 MICROgram(s)/kG/Min (10.6 mL/Hr) IV Continuous <Continuous>  enoxaparin Injectable 40 milliGRAM(s) SubCutaneous daily  fentaNYL   Infusion. 0.5 MICROgram(s)/kG/Hr (2.95 mL/Hr) IV Continuous <Continuous>  hydroxychloroquine 200 milliGRAM(s) Oral every 12 hours  hydroxychloroquine   Oral   midazolam Infusion 0.02 mG/kG/Hr (1.18 mL/Hr) IV Continuous <Continuous>  norepinephrine Infusion 0.05 MICROgram(s)/kG/Min (5.53 mL/Hr) IV Continuous <Continuous>  pantoprazole  Injectable 40 milliGRAM(s) IV Push daily  petrolatum Ophthalmic Ointment 1 Application(s) Both EYES every 4 hours  phenylephrine    Infusion 0.1 MICROgram(s)/kG/Min (2.21 mL/Hr) IV Continuous <Continuous>  polyethylene glycol 3350 17 Gram(s) Oral daily  potassium phosphate IVPB 15 milliMole(s) IV Intermittent <User Schedule>  pravastatin 80 milliGRAM(s) Oral at bedtime  propofol Infusion 20 MICROgram(s)/kG/Min (7.08 mL/Hr) IV Continuous <Continuous>    MEDICATIONS  (PRN):  acetaminophen   Tablet .. 650 milliGRAM(s) Oral every 6 hours PRN Temp greater or equal to 38C (100.4F), Mild Pain (1 - 3)      DVT Prophylaxis:[facility]    Advanced Directives:  Discussed with:    Visit Information: 43    ** Time is exclusive of billed procedures and/or teaching and/or routine family updates.

## 2020-03-28 NOTE — CHART NOTE - NSCHARTNOTEFT_GEN_A_CORE
Cousin called and updated about pt's condition.  All questions are answered. Bandar [phone] - speaks English    Cousin called and updated about pt's condition.  All questions are answered.

## 2020-03-28 NOTE — PROGRESS NOTE ADULT - SUBJECTIVE AND OBJECTIVE BOX
HPI: 43 y/o male with a PMHx of HLD, who presented to the ED c/o dyspnea x1 week and cough x3 days, with increasing SOB, cough, and sore throat, found to be COVID-19 positive, with acute hypoxemic respiratory failure, ARDS, secondary to severe sepsis viral COVID 19 pneumonia, septic shock, complicated by ATN    Hosp day #3  Vent day #3  pF ratio: 230    ARDS  Oxygenation improving, weaned to 40 --> 30% FiO2 PEEP +16 --> +12  Sedated on Propofol, Fentanyl, Versed, and Paralyzed with Nimbex  Shock on Norepinephrine    RN reported patient to be vomiting tube feeds today despite intermittent feeding, and trial of bolus feeding  Changed to trophic feeds at 20cc/hr  Now called that patient is hypoxic SpO2 80's, vomiting tube feeds   OGT placed to suction, tube feeds d/c   FIO2 increased to 50%  AXR, CXR in AM to r/o ileus, worsening ARDS due to aspiration pneumonitis  Net pos ~2L, AM Lasix to be given      Vital signs reviewed and physical exam performed where pertinent and urgently required.    Lab/radiology studies/ABG/meds reviewed and interpreted into the assessment and treatment plan.        Assessment/Plan/Therapeutic interventions:    Neuro: Deep sedation and neuromuscular blockade to facilitate ventilation and improve compliance to optimize oxygenation    CV: Shock with pressor support titrating to maintain MAP >65. Monitoring end points of perfusion. QTC monitoring while on azithro and hydroxychloroquine.    Pulm: Intubated on full vent support, low Tv lung protective strategy 4-8 cc/kg IBW per ARDSnet guidelines as able to maintain plateau pressures <30, allowing for permissive hypercapnea. High PEEP, low FiO2 strategy per ARDSnet, worsening oxygenation now likely due to superimposed aspiration, titrating to maintain paO2 55-80.  AM CXR. Did not require prone positioning overnight.    GI: PPI for GI ppx. Enteric feeds d/c, obtain AXR to r/o ileus. Holding antiemetics/promotility agents due to need for other QTc prolonging agents.     Renal: RUFUS worsening. Monitoring renal indices and fluid status, got Lasix 40 yesterday, still net positive 2L, give additional lasix 40mg in AM to goal euvolemic to net negative fluid balance as tolerated by hemodynamics.    Heme: Pharmacologic DVT ppx in addition to SCDs    ID: Hydroxychloroquine and Azithromycin. Trending fever curve.    Endo: Aggressive glycemic control to keep FS glucose to < 180mg/dl while critically ill.         COVID 19 specific considerations and therapeutic options based on the available and rapidly changing literature    42 minutes of critical care time spent in the management of this critically ill COVID-19 patient/PUI patient suffering from multisystem organ failure with continuous assessments and interventions based on the interpretation of multiple databases. Critical care time not inclusive of independent time spent on procedures performed.

## 2020-03-29 NOTE — PROGRESS NOTE ADULT - SUBJECTIVE AND OBJECTIVE BOX
HPI:  43 y/o male with a PMHx of HLD presents to the ED c/o dyspnea x1 week and cough x3 days. Pt states he had a fever 3 days ago but it went away and has not needed to take medications. Pt reports he became severely SOB with increasing cough and sore throat today so came to ED.Pt admitted to the ICU for acute hypoxic respiratory failure, COVID r/o, ARDS,     3/26: Patient seen vented, Hypotensive, deeply sedated and paralyzed.    3/27: He remains deeply sedates and paralyzed.  On 40% PEEP 14 now  3/28: Vented deeply sedated and on cis.  Decreasing PEEP to 10, Vomiting  3/29: Vented and now off paralytics, Fevers and positive UA          PAST MEDICAL & SURGICAL HISTORY:  HLD (hyperlipidemia)  No significant past surgical history      FAMILY HISTORY:  No pertinent family history in first degree relatives      Social Hx:    Allergies    No Known Allergies    Intolerances            ICU Vital Signs Last 24 Hrs  T(C): 40 (29 Mar 2020 08:00), Max: 40 (28 Mar 2020 21:00)  T(F): 104 (29 Mar 2020 08:00), Max: 104 (28 Mar 2020 21:00)  HR: 83 (29 Mar 2020 11:15) (81 - 110)  BP: 93/59 (29 Mar 2020 11:15) (80/50 - 130/73)  BP(mean): 68 (29 Mar 2020 11:15) (56 - 86)  ABP: 96/56 (29 Mar 2020 11:15) (76/46 - 129/75)  ABP(mean): 72 (29 Mar 2020 11:15) (58 - 99)  RR: 26 (29 Mar 2020 11:15) (23 - 51)  SpO2: 92% (29 Mar 2020 10:45) (80% - 97%)      Mode: AC/ CMV (Assist Control/ Continuous Mandatory Ventilation)  RR (machine): 24  TV (machine): 500  FiO2: 50  PEEP: 12  ITime: 1  PIP: 30      I&O's Summary    28 Mar 2020 07:01  -  29 Mar 2020 07:00  --------------------------------------------------------  IN: .8 mL / OUT: 1840 mL / NET: 299.8 mL    29 Mar 2020 07:01  -  29 Mar 2020 11:25  --------------------------------------------------------  IN: 312.2 mL / OUT: 750 mL / NET: -437.8 mL                              12.2   8.51  )-----------( 297      ( 29 Mar 2020 05:00 )             36.6       03-    145  |  109<H>  |  14  ----------------------------<  114<H>  3.5   |  30  |  0.95    Ca    7.4<L>      29 Mar 2020 05:00  Phos  1.6       Mg     2.3         TPro  6.0  /  Alb  1.6<L>  /  TBili  2.2<H>  /  DBili  x   /  AST  88<H>  /  ALT  13  /  AlkPhos  88              ABG - ( 29 Mar 2020 03:21 )  pH, Arterial: 7.48  pH, Blood: x     /  pCO2: 41    /  pO2: 51    / HCO3: 30    / Base Excess: 6.1   /  SaO2: 87                  Urinalysis Basic - ( 29 Mar 2020 03:00 )    Color: [place] / Appearance: Clear / S.015 / pH: x  Gluc: x / Ketone: Trace  / Bili: Moderate / Urobili: 8 mg/dL   Blood: x / Protein: 100 mg/dL / Nitrite: Positive   Leuk Esterase: Trace / RBC: 0-2 /HPF / WBC 0-2   Sq Epi: x / Non Sq Epi: Occasional / Bacteria: Moderate        MEDICATIONS  (STANDING):  azithromycin   Tablet 250 milliGRAM(s) Oral every 24 hours  chlorhexidine 0.12% Liquid 15 milliLiter(s) Oral Mucosa every 12 hours  chlorhexidine 4% Liquid 1 Application(s) Topical <User Schedule>  dexMEDEtomidine Infusion 0.5 MICROgram(s)/kG/Hr (7.38 mL/Hr) IV Continuous <Continuous>  enoxaparin Injectable 40 milliGRAM(s) SubCutaneous daily  hydroxychloroquine 200 milliGRAM(s) Oral every 12 hours  hydroxychloroquine   Oral   midazolam Infusion 0.02 mG/kG/Hr (1.18 mL/Hr) IV Continuous <Continuous>  norepinephrine Infusion 0.05 MICROgram(s)/kG/Min (5.53 mL/Hr) IV Continuous <Continuous>  pantoprazole  Injectable 40 milliGRAM(s) IV Push daily  petrolatum Ophthalmic Ointment 1 Application(s) Both EYES every 4 hours  piperacillin/tazobactam IVPB.. 3.375 Gram(s) IV Intermittent every 8 hours  polyethylene glycol 3350 17 Gram(s) Oral daily  potassium phosphate / sodium phosphate powder 2 Packet(s) Oral two times a day  pravastatin 80 milliGRAM(s) Oral at bedtime  propofol Infusion 20 MICROgram(s)/kG/Min (7.08 mL/Hr) IV Continuous <Continuous>    MEDICATIONS  (PRN):  acetaminophen   Tablet .. 650 milliGRAM(s) Oral every 6 hours PRN Temp greater or equal to 38C (100.4F), Mild Pain (1 - 3)      DVT Prophylaxis:    Advanced Directives:  Discussed with:    Visit Information:    ** Time is exclusive of billed procedures and/or teaching and/or routine family updates.

## 2020-03-29 NOTE — PROGRESS NOTE ADULT - SUBJECTIVE AND OBJECTIVE BOX
HPI: 43 y/o male with a PMHx of HLD, who presented to the ED c/o dyspnea x1 week and cough x3 days, with increasing SOB, cough, and sore throat, found to be COVID-19 positive, with acute hypoxemic respiratory failure, ARDS, secondary to severe sepsis viral COVID 19 pneumonia, septic shock, complicated by ATN    Hosp day #4  Vent day #4  pF ratio: 200    Continues to require full vent support, oxygenation slowly improving PEEP weaned +12 --> +10  Sedated on Propofol, Versed, and Precedex. Fentanyl d/c, weaned off Paralytics as well  Distributive shock on Norepinephrine  New high fever ~104'F, pancultured and started on empiric antibiotics   RUFUS improving        Vital signs reviewed and physical exam performed where pertinent and urgently required.    Lab/radiology studies/ABG/meds reviewed and interpreted into the assessment and treatment plan.        Assessment/Plan/Therapeutic interventions:    Neuro: Deep sedation to facilitate ventilation and improve compliance to optimize oxygenation    CV: Pressor support titrating to maintain MAP >65. Monitoring end points of perfusion. QTC monitoring while on azithro and hydroxychloroquine.    Pulm:  Intubated on full vent support, low Tv lung protective strategy 4-8 cc/kg IBW per ARDSnet guidelines as able to maintain plateau pressures <30, allowing for permissive hypercapnea pH >7.15. High PEEP, low FiO2 strategy per ARDSnet, FiO2 40%, PEEP weaned to +10, will obtain repeat ABG and continue to manipulate vent, titrating to maintain paO2 55-80.    GI: PPI for GI ppx. Enteric feeds as tolerated. Check TG in AM while on Propofol gtt.    Renal: RUFUS improving with diuresis. Slightly net positive still, will reassess volume status in AM and redose Lasix if still positive.    Heme: Pharmacologic DVT ppx in addition to SCDs    ID: New high fever, pancultured and started on empiric antibiotics with Zosyn and Vancomycin x 1 to cover HAP/VAP. Trending fever curve, PCT and WBC.    Endo: Aggressive glycemic control to keep FS glucose to < 180mg/dl while critically ill.         COVID 19 specific considerations and therapeutic options based on the available and rapidly changing literature    42 minutes of critical care time spent in the management of this critically ill COVID-19 patient/PUI patient suffering from multisystem organ failure with continuous assessments and interventions based on the interpretation of multiple databases. Critical care time not inclusive of independent time spent on procedures performed.

## 2020-03-29 NOTE — PROGRESS NOTE ADULT - ASSESSMENT
A/P: 44 male who is ARDS likely from COVID and hypotensive from Septic Shock    Plan:  CCU    PULM: Decreased PEEP to 10, Fi O2 to 40, Continue PLAQ/Z-MAX.  Do not need to prone.  Will continue Start to wean off paralytics.  D/C Fentanyl    Cardio: Continue Kalyan for BP supprt, hypotensive from Septic Shock    Renal: No RUFUS, Lasix 40 x 1 today for positive fluid balance    GI: PPI, Tube feeds resume as he tolerates    Lovenox
A/P: 44 male who is ARDS likely from COVID and hypotensive from Septic Shock    Plan:  CCU    PULM: Decreased PEEP 12, Fi O2 to 50, Continue PLAQ/Z-MAX.  Do not need to prone.  Off paralytics.  D/C Fentanyl.  Versed Precedex    Cardio: Off Kalyan    Renal: No RUFUS, Was given Lasix over night.  I/O are OK EQUAL    GI: PPI, Tube feeds as he tolerates    ID: UA positive, Zosyn started and 1 dose Vanco.  D/C Cummings, Cultures sent    Lovenox
A/P: 44 male who is ARDS likely from COVID and hypotensive from Septic Shock    Plan:  CCU    PULM: Decreased PEEP to 14, Decreased Fi O2 to 40, Continue PLAQ/Z-MAX.  Do not need to prone.  Will continue paralytics and review tomorrow      Cardio: Continue Kalyan for BP supprt, hypotensive from Septic Shock    Renal: No RUFUS, Good UO so far, Lasix 40 x 1 today for positive fluid balance    GI: PPI, Tube feeds     Lovenox
A/P: 44 male who is ARDS likely from COVID and hypotensive from Septic Shock    Plan:  CCU    PULM: Increased PEEP to 16, Decreased Fi O2 to 80, Continue PLAQ/Z-MAX.  Will need to determine after tyhe next ABG id he will need PRONE     Cardio: Start Kalyan for BP supprt, hypotensive from Septic Shock    Renal: No RUFUS, Good UO so far    GI: PPI, Start Tube feeds     Lovenox

## 2020-03-29 NOTE — PROGRESS NOTE ADULT - RS GEN PE MLT RESP DETAILS PC
breath sounds equal/no rales/no rhonchi
no rales/breath sounds equal
breath sounds equal/rhonchi
no rales/no rhonchi/breath sounds equal

## 2020-03-30 NOTE — CHART NOTE - NSCHARTNOTEFT_GEN_A_CORE
Patient with progressively increasing fever throughout the night w/ temperatures noted at 109F. Multiple attempts made to help aide in cooling including tylenol, cooling blanket, and cold fluids without improvement. Discussed w/ e-ICU attending, no medications on board or signs that would concern for NMS.  Per discussion attempted to give Solu-Medrol 125mg with no improvement noted. Fever associated with shock escalating to triple pressor therapy without progressively declining blood pressure. Associated hypoxia as well with FiO2 increased to 100% and PEEP +10. Family was notified of rapid decline and high likelihood of expiration overnight    Per discussion with other high level staff, patient not candidate for ACLS given +COVID-19 status with global pandemic and high risk of infecting staff with resuscitation and highly unlikely survival.    Noted to be asystole on monitor. No heart sounds auscultated    Time of Death: 05:43    Family and Intensivist to be notified Patient with progressively increasing fever throughout the night w/ temperatures noted at 109F. Multiple attempts made to help aide in cooling including tylenol, cooling blanket, and cold fluids without improvement. Discussed w/ e-ICU attending, no medications on board or signs that would concern for NMS.  Per discussion attempted to give Solu-Medrol 125mg with no improvement noted. Fever associated with shock escalating to triple pressor therapy without progressively declining blood pressure. Associated hypoxia as well with FiO2 increased to 100% and PEEP +10. Family was notified of rapid decline and high likelihood of expiration overnight    Per discussion with other high level staff, patient not candidate for ACLS given +COVID-19 status with global pandemic and high risk of infecting staff with resuscitation and highly unlikely survival.    Noted to be asystole on monitor. No heart sounds auscultated    Time of Death: 05:43    Family notified and condolences offered. Intensivist to be notified Patient with progressively increasing fever throughout the night w/ temperatures noted at 109F. Multiple attempts made to help aide in cooling including tylenol, cooling blanket, and cold fluids without improvement. Discussed w/ e-ICU attending, no medications on board or signs that would concern for NMS.  Per discussion attempted to give Solu-Medrol 125mg with no improvement noted. Fever associated with shock escalating to triple pressor therapy with progressively declining blood pressure. Associated hypoxia as well with FiO2 increased to 100% and PEEP +10. Family was notified of rapid decline and high likelihood of expiration overnight    Per discussion with other high level staff, patient not candidate for ACLS given +COVID-19 status with global pandemic and high risk of infecting staff with resuscitation and highly unlikely survival.    Noted to be asystole on monitor. No heart sounds auscultated    Time of Death: 05:43    Family notified and condolences offered. Intensivist to be notified Patient with progressively increasing fever throughout the night w/ temperatures noted at 109F. Multiple attempts made to help aide in cooling including tylenol, cooling blanket, and cold fluids without improvement. Discussed w/ e-ICU attending, no medications on board or physical exam signs that would concern for NMS or MH.  Per discussion attempted to give Solu-Medrol 125mg with no improvement noted. Fever associated with shock escalating to triple pressor therapy with progressively declining blood pressure. Associated hypoxia as well with FiO2 increased to 100% and PEEP +10. Family was notified of rapid decline and high likelihood of expiration overnight    Per discussion with other high level staff, patient not candidate for ACLS given +COVID-19 status with global pandemic and high risk of infecting staff with resuscitation and highly unlikely survival.    Noted to be asystole on monitor. No heart sounds auscultated    Time of Death: 05:43    Family notified and condolences offered. Intensivist to be notified

## 2020-03-30 NOTE — PROGRESS NOTE ADULT - SUBJECTIVE AND OBJECTIVE BOX
Patient is a 44y old  Male who presents with a chief complaint of COVID -19 acute hypoxic RF (29 Mar 2020 11:25)      BRIEF HOSPITAL COURSE: 44y Male pmhx HLD presented to ED w/ complaints of dyspnea and cough. Admitted for hypoxia and found to be COVID-19 positive. Patient developed acute hypoxic respiratory failure requiring intubation and mechanical ventilation.    Hosp day #5  Vent day #5    Continuos sedation w/ Propofol gtt, Precedex gtt, and Versed gtt.  Blood pressure support w/ Levophed gtt. High fevers overnight Tmax 107.8F. Hypoxia improving, PEEP lowered to +8    Vent day #  Mode: AC/ CMV (Assist Control/ Continuous Mandatory Ventilation)  RR (machine): 24  TV (machine): 500  FiO2: 50  PEEP: 8  ITime: 1  PIP: 31        Vital signs / Reviewed and Physical Exam Performed where pertinent and urgently required    Lab / Radiology  studies / ABG / Meds -  reviewed and interpreted into the assessment and treatment plan.      Assessment/Plan/Therapeutic interventions      Neuro    -Continuos sedation w/ Propofol gtt, Precedex gtt, and Versed gtt to facilitate safe ventilation    CV   -Levophed gtt for shock state, actively titrating as needed to maintain MAP 65  -Avoiding fluid challenges  -QTC monitoring while on Azithromycin and Hydroxychloroquine.    Pulm   -Decreased PEEP to +8. Remains on 50% FiO2  -ARDS-NET 4-6cc/kg IBW TV as able to maintain plateau pressures <30  -Prone ventilation consideration as feasible  Pa02/Fi02 < 150 on Fi02 >60% and PEEP at least 5   -Vent bundle Reviewed     GI  -PPI  Enteric feeds as tolerated in tandem with NMB and prone ventilation    Renal  -Even to negative fluid balance as tolerated by hemodynamics and renal fx.  Feeds to be provided in lieu of IVF.     Heme     -Pharmacologic DVT PPx  in addition to SCD's    ID   -High fevers overnight. Utilizing Tylenol and cooling blanket PTN. COVID-19+. Pan-cultured, UA positive. Coverage w/ Hydroxychloroquine and Zosyn. S/p 1 dose Vanco earlier. ABX discontinuation based on discussion with ID in conjunction with clinical features, culture data, and judicious procalcitonin monitoring.      Endo  -Aggressive glycemic control to limit FS glucose to < 180mg/dl.      COVID 19 specific considerations and therapeutic  options based on the available and rapidly changing literature    Goals of care considerations:  Ongoing assessment for patient specific treatment options based on progression or decline.  I have involved the family with updates and requests in guidance for medical decision making.          40  Minutes of critical care tiem spent in the management of this critically ill COVID-19 patient/PUI patient with continuous assessments and interventions based on the interpretation of multiple databases. Patient is a 44y old  Male who presents with a chief complaint of COVID -19 acute hypoxic RF (29 Mar 2020 11:25)      BRIEF HOSPITAL COURSE: 44y Male pmhx HLD presented to ED w/ complaints of dyspnea and cough. Admitted for hypoxia and found to be COVID-19 positive. Patient developed acute hypoxic respiratory failure requiring intubation and mechanical ventilation.    Hosp day #5  Vent day #5    Continuos sedation w/ Propofol gtt, Precedex gtt, and Versed gtt.  Blood pressure support w/ Levophed gtt. High fevers overnight Tmax 107.8F. Hypoxia improving, PEEP lowered to +8    Vent day #  Mode: AC/ CMV (Assist Control/ Continuous Mandatory Ventilation)  RR (machine): 24  TV (machine): 500  FiO2: 50  PEEP: 8  ITime: 1  PIP: 31        Vital signs / Reviewed and Physical Exam Performed where pertinent and urgently required    Lab / Radiology  studies / ABG / Meds -  reviewed and interpreted into the assessment and treatment plan.      Assessment/Plan/Therapeutic interventions      Neuro    -Continuos sedation w/ Propofol gtt, Precedex gtt, and Versed gtt to facilitate safe ventilation    CV   -Levophed gtt for shock state, actively titrating as needed to maintain MAP 65  -Avoiding fluid challenges  -QTC monitoring while on Azithromycin and Hydroxychloroquine.    Pulm   -Decreased PEEP to +8. Remains on 50% FiO2  -ARDS-NET 4-6cc/kg IBW TV as able to maintain plateau pressures <30  -Prone ventilation consideration as feasible  Pa02/Fi02 < 150 on Fi02 >60% and PEEP at least 5   -Vent bundle Reviewed     GI  -PPI  Enteric feeds as tolerated in tandem with NMB and prone ventilation    Renal  -Even to negative fluid balance as tolerated by hemodynamics and renal fx.  Feeds to be provided in lieu of IVF.     Heme     -Pharmacologic DVT PPx  in addition to SCD's    ID   -High fevers overnight. Utilizing Tylenol and cooling blanket PTN. COVID-19+. Pan-cultured, UA positive. Coverage w/ Hydroxychloroquine and Zosyn. S/p 1 dose Vanco earlier. ABX discontinuation based on discussion with ID in conjunction with clinical features, culture data, and judicious procalcitonin monitoring.      Endo  -Aggressive glycemic control to limit FS glucose to < 180mg/dl.      COVID 19 specific considerations and therapeutic  options based on the available and rapidly changing literature    Goals of care considerations:  Ongoing assessment for patient specific treatment options based on progression or decline.  I have involved the family with updates and requests in guidance for medical decision making.        Addendum:   -Worsening Hypoxia with fever. PEEP Increased to +10 and FiO2 to 100%      40  Minutes of critical care tiem spent in the management of this critically ill COVID-19 patient/PUI patient with continuous assessments and interventions based on the interpretation of multiple databases. Patient is a 44y old  Male who presents with a chief complaint of COVID -19 acute hypoxic RF (29 Mar 2020 11:25)      BRIEF HOSPITAL COURSE: 44y Male pmhx HLD presented to ED w/ complaints of dyspnea and cough. Admitted for hypoxia and found to be COVID-19 positive. Patient developed acute hypoxic respiratory failure requiring intubation and mechanical ventilation.    Hosp day #5  Vent day #5    Continuos sedation w/ Propofol gtt, Precedex gtt, and Versed gtt.  Blood pressure support w/ Levophed gtt. High fevers overnight Tmax 107.8F. Hypoxia improving, PEEP lowered to +8    Vent day #  Mode: AC/ CMV (Assist Control/ Continuous Mandatory Ventilation)  RR (machine): 24  TV (machine): 500  FiO2: 50  PEEP: 8  ITime: 1  PIP: 31        Vital signs / Reviewed and Physical Exam Performed where pertinent and urgently required    Lab / Radiology  studies / ABG / Meds -  reviewed and interpreted into the assessment and treatment plan.      Assessment/Plan/Therapeutic interventions      Neuro    -Continuos sedation w/ Propofol gtt, Precedex gtt, and Versed gtt to facilitate safe ventilation    CV   -Levophed gtt for shock state, actively titrating as needed to maintain MAP 65  -Avoiding fluid challenges  -QTC monitoring while on Azithromycin and Hydroxychloroquine.    Pulm   -Decreased PEEP to +8. Remains on 50% FiO2  -ARDS-NET 4-6cc/kg IBW TV as able to maintain plateau pressures <30  -Prone ventilation consideration as feasible  Pa02/Fi02 < 150 on Fi02 >60% and PEEP at least 5   -Vent bundle Reviewed     GI  -PPI  Enteric feeds as tolerated in tandem with NMB and prone ventilation    Renal  -Even to negative fluid balance as tolerated by hemodynamics and renal fx.  Feeds to be provided in lieu of IVF.     Heme     -Pharmacologic DVT PPx  in addition to SCD's    ID   -High fevers overnight. Utilizing Tylenol and cooling blanket PTN. COVID-19+. Pan-cultured, UA positive. Coverage w/ Hydroxychloroquine and Zosyn. S/p 1 dose Vanco earlier. ABX discontinuation based on discussion with ID in conjunction with clinical features, culture data, and judicious procalcitonin monitoring.      Endo  -Aggressive glycemic control to limit FS glucose to < 180mg/dl.      COVID 19 specific considerations and therapeutic  options based on the available and rapidly changing literature    Goals of care considerations:  Ongoing assessment for patient specific treatment options based on progression or decline.  I have involved the family with updates and requests in guidance for medical decision making.        Addendum:   -Worsening Hypoxia with fever. PEEP Increased to +10 and FiO2 to 100%  -Fever rapidly increasing currently febrile to 108F. Discussed w/ e-ICU; patient is not stiff on exam and no NMB or any other medications that would raise concern for NMS. Will give high dose Solu-medrol for likely cytokine storm. Cooling blanket. Cold IVF and Tylenol PRN.      40  Minutes of critical care tiem spent in the management of this critically ill COVID-19 patient/PUI patient with continuous assessments and interventions based on the interpretation of multiple databases. Patient is a 44y old  Male who presents with a chief complaint of COVID -19 acute hypoxic RF (29 Mar 2020 11:25)      BRIEF HOSPITAL COURSE: 44y Male pmhx HLD presented to ED w/ complaints of dyspnea and cough. Admitted for hypoxia and found to be COVID-19 positive. Patient developed acute hypoxic respiratory failure requiring intubation and mechanical ventilation.    Hosp day #5  Vent day #5    Continuos sedation w/ Propofol gtt, Precedex gtt, and Versed gtt.  Blood pressure support w/ Levophed gtt. High fevers overnight Tmax 107.8F. Hypoxia improving, PEEP lowered to +8    Vent day #  Mode: AC/ CMV (Assist Control/ Continuous Mandatory Ventilation)  RR (machine): 24  TV (machine): 500  FiO2: 50  PEEP: 8  ITime: 1  PIP: 31        Vital signs / Reviewed and Physical Exam Performed where pertinent and urgently required    Lab / Radiology  studies / ABG / Meds -  reviewed and interpreted into the assessment and treatment plan.      Assessment/Plan/Therapeutic interventions      Neuro    -Continuos sedation w/ Propofol gtt, Precedex gtt, and Versed gtt to facilitate safe ventilation    CV   -Levophed gtt for shock state, actively titrating as needed to maintain MAP 65  -Avoiding fluid challenges  -QTC monitoring while on Azithromycin and Hydroxychloroquine.    Pulm   -Decreased PEEP to +8. Remains on 50% FiO2  -ARDS-NET 4-6cc/kg IBW TV as able to maintain plateau pressures <30  -Prone ventilation consideration as feasible  Pa02/Fi02 < 150 on Fi02 >60% and PEEP at least 5   -Vent bundle Reviewed     GI  -PPI  Enteric feeds as tolerated in tandem with NMB and prone ventilation    Renal  -Even to negative fluid balance as tolerated by hemodynamics and renal fx.  Feeds to be provided in lieu of IVF.     Heme     -Pharmacologic DVT PPx  in addition to SCD's    ID   -High fevers overnight. Utilizing Tylenol and cooling blanket PTN. COVID-19+. Pan-cultured, UA positive. Coverage w/ Hydroxychloroquine and Zosyn. S/p 1 dose Vanco earlier. ABX discontinuation based on discussion with ID in conjunction with clinical features, culture data, and judicious procalcitonin monitoring.      Endo  -Aggressive glycemic control to limit FS glucose to < 180mg/dl.      COVID 19 specific considerations and therapeutic  options based on the available and rapidly changing literature    Goals of care considerations:  Ongoing assessment for patient specific treatment options based on progression or decline.  I have involved the family with updates and requests in guidance for medical decision making.        Addendum:   -Worsening Hypoxia with fever. PEEP Increased to +10 and FiO2 to 100%  -Fever rapidly increasing currently febrile to 108F. Discussed w/ e-ICU; patient is not stiff on exam and no NMB or any other medications that would raise concern for NMS. Will give high dose Solu-medrol for likely cytokine storm. Cooling blanket. Cold IVF and Tylenol PRN. Check STAT labs including CK      40  Minutes of critical care tiem spent in the management of this critically ill COVID-19 patient/PUI patient with continuous assessments and interventions based on the interpretation of multiple databases.

## 2020-03-30 NOTE — CHART NOTE - NSCHARTNOTEFT_GEN_A_CORE
Patient with progressive multi-pressor shock. Attempted to contact family regarding poor condition and worsening status. No answer at both numbers left, message left with call back number

## 2020-03-30 NOTE — DISCHARGE NOTE FOR THE EXPIRED PATIENT - HOSPITAL COURSE
45yo M presented to  ED on 3/26/2020 with SOB and cough, found to have Covid-19. Patient was intubated for respiratory support, placed on plaquenil and Azithromycin as well as Pravastatin for anti-inflammatory measures for ARDS in Covid-19 infection. Dual pressor use for pressure support, patient continued to deteriorate, spiking high fevers. On evening of 3/29/2020 temperature was recorded at 107, and continued to increase, despite cooling measures, such as cooling blanket with ice packs, and cold saline infusion. Patient reached temperature as high as 109.2, increased hypoxia despite increased PEEP and 100% O2 delivery on ventilation, BP decreased while increasing of pressor infusions. Patient suffered cardiopulmonary arrest, was found in asystole without recovery at 5:40am. CPR was not begun as it was medically futile due to patient's condition. Patient pronounced at 5:40am by cardiopulmonary criteria.

## 2020-03-30 NOTE — PROGRESS NOTE ADULT - REASON FOR ADMISSION
COVID -19 acute hypoxic RF

## 2020-03-30 NOTE — PROVIDER CONTACT NOTE (CHANGE IN STATUS NOTIFICATION) - SITUATION
pt temp went from 103 to 107.9 in 2 hours. PA aware and orders received. B/P remained unstable ans increase in heart rate noted. Neosynepherine ordered and hung. jroge at the max and b/p remains low, vasopressin added with litlle effect. pt conntinued to decline, critical care PA aware. pt eventually raciel down to asystole. Critical care PA at bedside. Post mortem care given.

## 2020-03-30 NOTE — PROVIDER CONTACT NOTE (OTHER) - SITUATION
[facility] made aware of expiration. Due to positive Covid-19 patient is not a candidate for donation.

## 2020-03-31 LAB
CULTURE RESULTS: NO GROWTH — SIGNIFICANT CHANGE UP
SPECIMEN SOURCE: SIGNIFICANT CHANGE UP

## 2020-04-01 LAB
CULTURE RESULTS: SIGNIFICANT CHANGE UP
CULTURE RESULTS: SIGNIFICANT CHANGE UP
SPECIMEN SOURCE: SIGNIFICANT CHANGE UP
SPECIMEN SOURCE: SIGNIFICANT CHANGE UP

## 2020-04-06 DIAGNOSIS — J96.01 ACUTE RESPIRATORY FAILURE WITH HYPOXIA: ICD-10-CM

## 2020-04-06 DIAGNOSIS — J12.89 OTHER VIRAL PNEUMONIA: ICD-10-CM

## 2020-04-06 DIAGNOSIS — A41.9 SEPSIS, UNSPECIFIED ORGANISM: ICD-10-CM

## 2020-04-06 DIAGNOSIS — R65.21 SEVERE SEPSIS WITH SEPTIC SHOCK: ICD-10-CM

## 2020-04-06 DIAGNOSIS — E78.5 HYPERLIPIDEMIA, UNSPECIFIED: ICD-10-CM

## 2020-04-06 DIAGNOSIS — N17.0 ACUTE KIDNEY FAILURE WITH TUBULAR NECROSIS: ICD-10-CM

## 2020-04-06 DIAGNOSIS — J96.02 ACUTE RESPIRATORY FAILURE WITH HYPERCAPNIA: ICD-10-CM

## 2020-04-06 DIAGNOSIS — B97.29 OTHER CORONAVIRUS AS THE CAUSE OF DISEASES CLASSIFIED ELSEWHERE: ICD-10-CM

## 2020-04-06 DIAGNOSIS — E87.2 ACIDOSIS: ICD-10-CM

## 2020-10-27 NOTE — DISCHARGE NOTE ADULT - NS AS DC STROKE DX YN
Relevant Problems   No relevant active problems       Anesthetic History   No history of anesthetic complications            Review of Systems / Medical History  Patient summary reviewed, nursing notes reviewed and pertinent labs reviewed    Pulmonary    COPD (On prn home oxygen up to 5L/min): severe               Neuro/Psych     seizures    Psychiatric history (Anxiety)     Cardiovascular    Hypertension (Renal Artery Stenosis): poorly controlled  Valvular problems/murmurs: mitral insufficiency    CHF          Comments:   Sinus rhythm with short TN   Possible Left atrial enlargement   Prolonged QT   Abnormal ECG   No previous ECGs available   Confirmed by Donte Masters ([id number]) on 10/19/2020 7:54:21 AM       Echo Findings     Left Ventricle  Normal cavity size, systolic function (ejection fraction normal) and diastolic function. Mild concentric hypertrophy. Wall motion: normal. The estimated EF is 55 - 60%. Left Atrium  Normal cavity size. No atrial septal defect present. Right Ventricle  Normal cavity size, wall thickness and global systolic function. Right Atrium  Normal cavity size. Aortic Valve  Normal valve structure, no stenosis and no regurgitation. Mitral Valve  Normal valve structure and no stenosis. Mild regurgitation. Tricuspid Valve  Normal valve structure and no stenosis. Mild regurgitation. Pulmonic Valve  Pulmonic valve not well visualized, but normal doppler findings. No stenosis. Aorta  Normal aortic root. IVC/Hepatic Veins  Normal structure. Pericardium  Small circumferential pericardial effusion noted. No indications of tamponade present. There is a large left pleural effusion.           GI/Hepatic/Renal     GERD    Renal disease (Last Creatinine 1.8): CRI       Endo/Other      Hypothyroidism       Other Findings   Comments: Allergies  Sulfa (Sulfonamide Antibiotics)  Ht: 5' 5\" (1.651 m)  Weight: 49 kg (108 lb)  BMI: 17.97 kg/m²  CrCl:   26.7 mL/min (A)    Procedure  ECHO CLAUDIA W POSSIBLE CARDIOVERSION      Medical History  Hypertension  Chronic obstructive pulmonary disease (HCC)  Congestive heart failure (CHF) (HCC)  Hypertension  Chronic respiratory failure (HCC)  Renal artery stenosis (HCC)  Seizure disorder (HCC)  CHF (congestive heart failure) (HCC)  Mitral valve regurgitation  PEDRO PABLO (acute kidney injury) (HCC)  Anxiety  COPD (chronic obstructive pulmonary disease) with emphysema (HCC)  Dysphagia  Iron deficiency anemia  Thyroid disease  GERD (gastroesophageal reflux disease)  Anxiety  Psychiatric disorder           Physical Exam    Airway  Mallampati: II  TM Distance: 4 - 6 cm  Neck ROM: normal range of motion   Mouth opening: Normal     Cardiovascular    Rhythm: regular  Rate: normal         Dental  No notable dental hx       Pulmonary  Breath sounds clear to auscultation               Abdominal  GI exam deferred       Other Findings   Comments: Results for Jessica Hicks (MRN [id number]) as of 10/27/2020 10:45    10/27/2020 09:58  Potassium: 3.2 (L)         Anesthetic Plan    ASA: 4  Anesthesia type: general and total IV anesthesia          Induction: Intravenous  Anesthetic plan and risks discussed with: Patient and Family no

## 2021-04-21 NOTE — ED PROVIDER NOTE - RELIEVING FACTORS
HPI   Patient is a 1year-old male with chief complaint of laceration to head. Per his mother he struck his head on the coffee table at their house. She noted immediate bleeding from the laceration above the right brow. She feels that he has been more tired than normal following this injury. He has not had any nausea or vomiting. He is not complaining of head pain at this time. No changes in gait. He is otherwise acting normally. Review of Systems   Constitutional: Positive for activity change and fatigue. Negative for fever and irritability. HENT: Negative for congestion and rhinorrhea. Eyes: Negative for pain and redness. Respiratory: Negative for wheezing and stridor. Cardiovascular: Negative for chest pain and palpitations. Gastrointestinal: Negative for abdominal pain, nausea and vomiting. Musculoskeletal: Negative for arthralgias and joint swelling. Skin: Positive for wound. Negative for pallor and rash. Neurological: Negative for weakness and headaches. Psychiatric/Behavioral: Negative for agitation, behavioral problems and confusion. Physical Exam  Constitutional:       General: He is not in acute distress. Appearance: Normal appearance. He is normal weight. He is not toxic-appearing. HENT:      Head: Normocephalic. Comments: Small laceration, approximate 1 cm superior to the right brow. There is no active bleeding. Full-thickness laceration. Right Ear: External ear normal.      Left Ear: External ear normal.      Nose: Nose normal.      Mouth/Throat:      Mouth: Mucous membranes are moist.   Eyes:      Extraocular Movements: Extraocular movements intact. Conjunctiva/sclera: Conjunctivae normal.   Neck:      Musculoskeletal: Normal range of motion and neck supple. Cardiovascular:      Rate and Rhythm: Normal rate and regular rhythm. Pulses: Normal pulses.    Pulmonary:      Effort: Pulmonary effort is normal.      Breath sounds: Normal breath sounds. Abdominal:      General: Abdomen is flat. Palpations: Abdomen is soft. Tenderness: There is no abdominal tenderness. Musculoskeletal:         General: No swelling or tenderness. Skin:     General: Skin is warm and dry. Capillary Refill: Capillary refill takes less than 2 seconds. Neurological:      General: No focal deficit present. Mental Status: He is alert. Procedures   Dermabond applied to laceration to approximate skin edges. Steri-Strip applied. MDM   Patient presents with small laceration to the right forehead. Only additional symptoms discussed with mother were possibly increased drowsiness for the patient. No nausea or vomiting. No other concerning symptoms for injury to the brain. Laceration was approximated with Dermabond and Steri-Strip. Patient tolerated procedure well. Patient may follow-up with pediatrician for this injury. ED Course as of Apr 21 0129 Wed Apr 21, 2021   0101 Patient seen and examined. Small laceration above the right brow. Bleeding currently controlled. His mother's concern for apparent increased fatigue following the injury. No additional symptoms that she notes. No nausea or vomiting. No change in gait. Patient is not complaining of headache or dizziness. No additional injuries evident on exam.    [AS]   0123 Patient laceration approximated with Dermabond and Steri-Strip. No active bleeding.    [AS]      ED Course User Index  [AS] Jessica Carroll DO          ED Course as of Apr 21 0129   Wed Apr 21, 2021   0101 Patient seen and examined. Small laceration above the right brow. Bleeding currently controlled. His mother's concern for apparent increased fatigue following the injury. No additional symptoms that she notes. No nausea or vomiting. No change in gait. Patient is not complaining of headache or dizziness.   No additional injuries evident on exam.    [AS]   0123 Patient laceration approximated with Dermabond and Steri-Strip. No active bleeding.    [AS]      ED Course User Index  [AS] Irene Goodrich DO       --------------------------------------------- PAST HISTORY ---------------------------------------------  Past Medical History:  has no past medical history on file. Past Surgical History:  has no past surgical history on file. Social History:  reports that he has never smoked. He has never used smokeless tobacco. He reports that he does not drink alcohol or use drugs. Family History: family history is not on file. The patients home medications have been reviewed. Allergies: Patient has no known allergies. -------------------------------------------------- RESULTS -------------------------------------------------  Labs:  No results found for this visit on 04/21/21. Radiology:  No orders to display       ------------------------- NURSING NOTES AND VITALS REVIEWED ---------------------------  Date / Time Roomed:  4/21/2021 12:28 AM  ED Bed Assignment:  12/12    The nursing notes within the ED encounter and vital signs as below have been reviewed. Pulse 107   Temp 97.4 °F (36.3 °C) (Oral)   Resp 22   Wt 39 lb 14.4 oz (18.1 kg)   SpO2 100%   Oxygen Saturation Interpretation: Normal      ------------------------------------------ PROGRESS NOTES ------------------------------------------  1:23 AM EDT  I have spoken with the patient and mother and discussed todays results, in addition to providing specific details for the plan of care and counseling regarding the diagnosis and prognosis. Their questions are answered at this time and they are agreeable with the plan. I discussed at length with them reasons for immediate return here for re evaluation. They will followup with their Pediatrician by calling their office tomorrow.       --------------------------------- ADDITIONAL PROVIDER NOTES ---------------------------------  At this time the patient is without objective evidence of an acute process requiring hospitalization or inpatient management. They have remained hemodynamically stable throughout their entire ED visit and are stable for discharge with outpatient follow-up. The plan has been discussed in detail and they are aware of the specific conditions for emergent return, as well as the importance of follow-up. New Prescriptions    No medications on file       Diagnosis:  1. Laceration of forehead, initial encounter        Disposition:  Patient's disposition: Discharge to home  Patient's condition is stable. Ambar Armendariz DO  Resident  04/21/21 [id number]      ATTENDING PROVIDER ATTESTATION:     I have personally performed and/or participated in the history, exam, medical decision making, and procedures and agree with all pertinent clinical information. I have also reviewed and agree with the past medical, family and social history unless otherwise noted. I have discussed this patient in detail with the resident, and provided the instruction and education regarding falls, head injuries, lacerations and repair. My findings/Plan: Puncture type laceration of right forehead. No active bleeding. Will repair with dermabond. Observe. Heart RRR. Lungs CTA bilaterally. Abdomen soft. Bowel sounds normal. Discharge for out patient follow up.          Becky Martinez DO  04/21/21 8000 none

## 2021-06-13 NOTE — CONSULT NOTE ADULT - CONSULT REQUESTED DATE/TIME
10-Nov-2018 17:34

## 2022-08-09 NOTE — ED ADULT NURSE NOTE - LANGUAGE ASSISTANCE NEEDED
Patient with Parkinson's disease complicated by dyskinesia and recent hallucinations  Unclear if the hallucinations were related to the start of Gocovri  or UTI/pancreatitis  She felt that when on the Gocovri she had significant improvement of her dyskinesia, movements and wearing off  Will restart Gocovri to see if this was indeed the cause of the hallucinations  She will start with 1tab before bed for a month  IF hallucinations come back then STOP the Gocovri  If tolerated after a month she can increase back to 2tabs before bed  No other changes to her medication at this time 
Yes-Patient/Caregiver accepts free interpretation services...

## 2022-10-31 NOTE — ED PROVIDER NOTE - NS ED ATTENDING STATEMENT MOD
Mastoid Interpolation Flap Text: A decision was made to reconstruct the defect utilizing an interpolation axial flap and a staged reconstruction.  A telfa template was made of the defect.  This telfa template was then used to outline the mastoid interpolation flap.  The donor area for the pedicle flap was then injected with anesthesia.  The flap was excised through the skin and subcutaneous tissue down to the layer of the underlying musculature.  The pedicle flap was carefully excised within this deep plane to maintain its blood supply.  The edges of the donor site were undermined.   The donor site was closed in a primary fashion.  The pedicle was then rotated into position and sutured.  Once the tube was sutured into place, adequate blood supply was confirmed with blanching and refill.  The pedicle was then wrapped with xeroform gauze and dressed appropriately with a telfa and gauze bandage to ensure continued blood supply and protect the attached pedicle. Attending Only

## 2023-01-31 NOTE — PROGRESS NOTE ADULT - PROVIDER SPECIALTY LIST ADULT
[facility]  Outpatient Pediatric Speech Language Pathology  Language Evaluation     Patient Name: Joce Zavala MRN: [id number]   Patient Age: 5 y.o. 7 m.o. YOB: 2017   Adjusted Age: NA Referring Physician: Masha Harris MD    [facility] Affiliation: [facility] Pediatrician: Masha Harris MD       Date of Service: 1/25/2023 Visit Number: 1 out of 1   Schedule appointment time: 1:45  Authorization ending on: 4/25/2023   Time In: 1:55              Time Out: 2:35  Plan of Care Expiration: 7/25/2023       Therapy Diagnosis:  Encounter Diagnosis   Name Primary?    Speech articulation disorder     Medical Diagnosis:   Patient Active Problem List   Diagnosis    ADHD (attention deficit hyperactivity disorder), combined type    Feeding difficulties    Sensory processing difficulty    Chronic constipation        Currently being followed by: pediatrician  Current precautions: No current precautions  Trach/Vent/O2 Information: Room air      Subjective     Current Condition: Joce is a 5 y.o. 7 m.o. male, referred for evaluation secondary to concerns of speech articulation disorder. Joce's Mother was present for this evaluation and provided pertinent medical, developmental, and social information. Joce participated in a 60 minute formal SLP evaluation, which included family/caregiver education. Joce was awake, alert and calm during the evaluation and was able to follow instructions by caregiver/therapist. Joce's Mother reported that concerns include feeding difficulties related to picky eating and not gaining weight. She reported that she did not have any concerns for Joce's articulation and that she thought he was coming today for a feeding evaluation.       Past Medical History:  Joce has a PMH significant for none reported. Neurological history is significant for: None reported. Respiratory/Airway history is significant for: None reported. Cardiac history is  significant for: None reported. Gastrointestinal history is significant for: constipation. Renal history is significant for: None reported. Genetic history is significant for: None reported. Hematologic history includes: None reported. Craniofacial history includes: None reported. Previous surgical history includes: circumcision. Therapeutic history includes: Outpatient Speech therapy and currently receives OT at [facility].     ALLERGIES: Amoxicillin    MEDICATIONS: Joce has a current medication list which includes the following prescription(s): clonidine, cyproheptadine, guanfacine, hydrocortisone, lactulose, vyvanse, lisdexamfetamine, and polyethylene glycol.     Social History:  Joce lives at home with Parents and Sibling(s). Joce does attend school. Mother reports Casandras sleep tends to be characterized by: occasionally wakes up and currently takes Clonidine for sleep. Joce is reported to sleep on the couch, he will not sleep alone in his room. Results of the  hearing screen were: Pass. Current hearing ability is reported as: bilateral normal hearing. Vision is reported as normal: No issues reported. Joce has reportedly met developmental milestones. The following abuse/neglect/environmental concerns were noted during the session: none.    Family History   Problem Relation Age of Onset    ADD / ADHD Father     Allergies Father     Allergies Sister     Cancer Maternal Grandmother     Diabetes Maternal Grandmother     Heart disease Maternal Grandfather     Hypertension Maternal Grandfather     Diabetes Paternal Grandmother     Eczema Sister        Objective     The goals of this assessment are to:  Determine current articulation skill set and assess oral structure and function  Observe and report any clinical signs/symptoms of articulation disorder/delay  Observe current communication interaction between patient and caregiver  Determine any behavioral, sensory and psychosocial components   Determine any  appropriate referral sources    Pain:  FLACC Pain Scale  Face - 0 - No particular expression or smile  Legs - 0 - Normal position or relaxed  Activity - 0 - Lying quietly, normal position, moves easily  Cry - 0 - No cry (awake or asleep)  Consolability - 0 - Content, relaxed    Based on the above observations during the session, the following Behavioral Pain Score was obtained: 0 = Relaxed and comfortable    Reference: Kelvin S, Chris JOHNSON, Kalyn PRICE, et al: The FLACC: A behavioural scale for scoring postoperative pain in young children. Pediatric nursing 1997; 23:293-797.  Printed with permission © 2002, [employer] [facility] [employer] [facility].        Assessment     Oral Mechanism Examination:  Facial:  Symmetry: Symmetrical at rest, Mouth closed at rest, and Within functional limits  Buccal function: within normal limits    Lips:  Structure: Within functional limits  Frenum attachment: normal, within functional limits   Labial function: Within functional limits    Tongue:  Structure: Within normal limits  Frenum attachment: normal, within functional limits   Lingual function:    - Resting posture: In palate independently   - Posture during cry: Not applicable   - Lateralization: within normal limits   - Protrusion: within normal limits   - Elevation: within normal limits   - Lingual/Jaw dissociation: within normal limits   - Strength: within normal limits   - Tone: within normal limits   - Gag: not tested and not assessed    Mandible/jaw:  Structure: Within functional limits  Jaw function: within functional limits    Dentition:   - adequate/within normal limits and crowded teeth    Palate:  Structure: adequate/within functional limits  Velum: adequate/within functional limits  Uvula: adequate/within functional limits  Tonsils: within functional limits       Articulation Assessment:  The Loco Fr[place]oe Test of Articulation - Third Edition (GFTA-3) was administered to assess Joce's production of consonants  at the individual word and sentence level. This assessment consisted of a series of color pictures, which Joce was asked to label following specific instructions. Responses were recorded and analyzed to determine the presence/absence of an articulation delay/disorder.        Joce scored a standard score of 78 on the Sounds-In-Words Subtest of the GFTA-3, which is below average for Joce's chronological age level and -1.47 SD below the mean. This score places Joce in the 7th percentile, indicating the presence of a speech sound disorder. Joce demonstrated difficulty with producing vocalic /r/ such as /er/, /air/, and /ar/.     Sounds-in-Words Subtest  Raw Score Standard Score Percentile Rank Standard Deviation   9 78 7 -1.47     Sounds-in-words Phonetic Error Analysis  Sound Initial Medial Final   p      b      t      d      k      kw      g      m      n      ng      f      v      ?      ð      s      z      ?      t?      d?      r   w/distorted   l       ?   ^   w      j      h      Blends Initial Medial Final   bl      br b     dr      fr      gl      gr      kr      kw      nt      pl      pr      sl      st      sw      sp      tr        Ages at which 90% of the GFTA-3 Normative Sample Mastered Consonants and Consonant Clusters by Initial, Medial, and Final positions (Male):  (Note: Mastery = 85% or greater correct productions)  Age Initial Position Medial Position Final Position   2:0-2:5      2:6-2:11 /m/ /p/    3:0-3:5 /b/ /d/ /n/ /f/ /h/ /d/ /g/ /m/ /ng/ /f/ /p/ /n/ /f/    3:6-3:11  /k/ /w/ /n/ /z/ /j/  /b/ /d/ /k/ /m/ /nt/   4:0-4:5 /t/ /kw/ /b/ /k/ /g/ /v/   4:6-4:11  /s/ /sh/ /ch/ /dg/ /ch/ /sh/ /t/ /sh/ /ch/   5:0-5:11 /p/ /z/ /l/ /j/ /bl/ /pl/ /sp/ /st/ /sw/ /s/ /l/ /ng/ /z/    6:0-6:11 /g/ /v/ /dr/ /gl/ /gr/ /kr/ /tr/ /r/    7:0-7:11 /voiced th/ /r/ /br/ /fr/ /pr/ /sl/ /v/ /er/ /l/ /r/   8:0-8:11  /t/ /voiced th/ /dg/ /br/ /voiceless th/ /s/   >8:11 /voiceless th/       Feeding Evaluation  CURRENT  LEVEL OF FUNCTION: fully orally fed, picky eating behaviors, poor weight gain    PRIMARY GOAL FOR THERAPY: For Joce to accept vegetables and meats.    Caregivers report the following symptoms:   Symptom Reported Comment   Frequent URI []    Hx of PNA []    Seasonal Allergies [x]    Congestion []    Drooling []    Snoring  []    Milk Protein Allergy []    Eczema []    Constipation [x]    Reflux  []    Coughing/Choking []    Open Mouth Breathing []    Vomiting  []    Gagging/Retching  []    Slow weight gain [x]    Anterior Spillage []    Enteral Feeds  []    Picky Eating Behaviors [x]    Hx of Aspiration [x] x1- at a young age   Food Refusals [x]    Poor Sleep [x]    Food Intolerances  []    Sensory Concerns [x]      SWALLOWING and FEEDING HISTORIES:  Liquids Intake (Breast/Bottle/Cup): In infancy, pt was reportedly breast fed. Caregivers report no difficulties with infant feeding. He did not tolerate whole milk at age 1 and was put on Carmine milk for the first year. Purees were introduced around 1 year. Pt is able to drink from a straw cup, is able to drink from an open cup.   Solids Intake (Purees/Solids): Caregivers report no difficulties with solid feeding. Purees were introduced around 1 year. Solids were introduced around 1 year.  Feeding difficulties onset: Around 3-4  years old  Current Diet Consumed: crackers, chips, pretzels, cheese puffs, tostitos, pizza crust, hot dog buns, white bread, biscuits, doughnuts, muffins, cupcakes, cakes, cookies, chicken nuggets, turkey/ham from lunchable, breakfast sausage, vee, hot dogs, specific brand of lunch meat- ham/turkey, Jif peanut butter, nutella, oatmeal, poptarts, dry cereal, pancakes with syrup, waffles, toast with jelly, yogurt, shakes, fresh fruit, grits, juice- apple/fruit punch, sprite, chocolate milk, water, [place] breakfast drink, apples, bananas, grapes, pepperoni pizza, ramen, american cheese, whipped cream, and ice cream- vanilla or  chocolate  Refuses: meats, vegetables, sauces/dips   Mealtime Routine: 2 meals and snacking throughout the day, eats the best at breakfast  Previous feeding and swallowing intervention: yes, currently OT   Previous instrumental assessment of swallow: None  Respiratory Status: on room air  Oral Care Routine: good    BEHAVIOR: Feeding observation was unable to be completed today secondary to time constraints and no food/drinks being brought to the evaluation. Observation to be completed at next appointment.    IMPRESSIONS:   This 5 y.o. 7 m.o. old male presents with picky eating/food refusals. Currently, pt appears safe to consume all consistencies. Outpatient speech therapy is recommended for ongoing assessment and remediation of picky eating.     Findings/Results     Joce was observed to have impairments in the following areas: articulation skills necessary to support age appropriate communication. These impairments are characterized by: impaired agility of articulators, impaired oral placement of articulators, and oral/texture/food aversion. Joce's speech performance is negatively impacted by: impaired state regulation, impaired gastrointestinal function, and impaired intelligibility.    Tethered oral tissues are present and do not appear to be impacting functional and efficient speech. ST does not recommend referral to ENT/DDS for further evaluation and treatment.    Joce would benefit from speech therapy to progress towards goals listed below in order to address the above mentioned impairments for improved quality of life. Positive prognostic factors include: family support. Negative prognostic factors include: anxiety as reported by mother related to eating. Barriers to progress include: distance from clinic.     Rehab Potential: fair  The patient's spiritual, cultural, social, and educational needs were considered with no evidence of barriers noted, and the patient is agreeable to plan of care.        Recommendations/Referrals     Referrals Recommended: GI specialist for further evaluation/treatment, Nutrition/Dietician for assistance with diet management, and Behavioral Feeding Psychologist for further assessment/intervention  Follow up Recommended: Continue Occupational therapy as needed and Follow up with referring physician as needed      Plan     Joce will receive articulation therapy 1 times a week for 30-45 minutes on an outpatient basis with incorporation of parent education and a home program to facilitate carryover of learned therapy targets to the home and daily routine.    SLP will provide contact information for speech-language pathologist at this location and/or recommendations for appropriate referrals.    SLP will provide information and resources regarding oral motor and articulation development and overall development of milestones.     Short Term Objectives: (1/25/2023 to 4/25/2023)  Joce and/or caregiver will:   Correctly produce vocalic /r/ in all positions of words at the word, phrase, and sentence levels, given minimal cues, with 80% accuracy over 3 consecutive sessions.     Long Term Objectives: (1/25/2023 to 7/25/2023)  Joce and/or caregiver will:  Demonstrate improvement in articulation skills by independently producing age appropriate phonemes at the conversation level.   Demonstrate improvement in articulation skills by increasing intelligibility at the conversation level in known and unknown context with an unfamiliar listener.      Education      Joce's Mother was given education on appropriate oral structure placement for accurate articulation. Mother was also instructed in methods of creating a calm, stress free environment to ensure adequate progress. Mother was provided with instructions on appropriate oral motor movements associated with adequate articulation. Mother did verbalize understanding of all discussed.      Billing      Procedure: (79266) Evaluation of speech  sound production (e.g. articulation, phonological process, apraxia, dysarthria)  Total Minutes: 40  Total Untimed Units: 1  Number of Charges Billed: 1        Vivienne Fox MA, CCC-SLP, CLC  Speech Language Pathologist, Certified Lactation Counselor  1/25/2023

## 2023-11-27 NOTE — PROGRESS NOTE ADULT - [facility]
-- DO NOT REPLY / DO NOT REPLY ALL --  -- Message is from Engagement Center Operations (ECO) --    General Patient Message: Patient calling to check on status of xray order. Please advise     Caller Information       Type Contact Phone/Fax    11/27/2023 01:41 PM CST Phone (Incoming) Avery Yap (Self) [phone] (M)        Alternative phone number: NA    Can a detailed message be left? Yes    Message Turnaround:     Is it Working Hours? Yes - Working Hours     [place]:    Please give this turnaround time to the caller:   \"This message will be sent to [state Provider's name]. The clinical team will fulfill your request as soon as they review your message.\"                
regular rate and rhythm

## 2024-09-10 NOTE — ED STATDOCS - CONSTITUTIONAL NEGATIVE STATEMENT, MLM
Virtual Video Visit      Date: 09/10/2024   Patient Name: Alsiha Quintana  : 1969   MRN: [id number]     Chief Complaint:    Chief Complaint   Patient presents with    Insomnia    Headache       I have reviewed the E-Visit questionnaire and the patient's answers, my assessment and plan are listed below.     This provider is located at the home address on file with [facility]. using a secure PersistIQt Video Visit through [employer]. Patient is being seen remotely via telehealth at their home address in [place], and stated they are in a secure environment for this session. The patient's condition being diagnosed/treated is appropriate for telemedicine. The provider identified herself as well as her credentials. The patient, and/or patients guardian, consent to be seen remotely, and when consent is given they understand that the consent allows for patient identifiable information to be sent to a third party as needed. They may refuse to be seen remotely at any time. The electronic data is encrypted and password protected, and the patient and/or guardian has been advised of the potential risks to privacy not withstanding such measures.    You have chosen to receive care through a virtual video visit. Do you consent to use a video/audio connection for your medical care today? Yes    History of Present Illness: Alisha Quintana is a 55 y.o. female with HTN, HLD, T2DM who is seen today to follow up with insomnia and headaches.     She was seen about 6 weeks ago at which time we switched timing of lisinopril HCTZ to help with nocturia and started trial of trazodone prn. Switching the HCTZ to morning has been helpful, no longer waking up in the middle of the night to urinate. Trazodone has not been very effective for insomnia. Reports doubling dose one night (100mg) and this was very effective with both sleep onset and maintenance. Denies side effects.     She also was struggling with recurrent  "occipital headaches last visit - for this she wanted to defer additional medications and optimize sleep instead. She reports headaches resolve when she is able to get a good night of sleep. Direct correlation with onset of headaches and poor sleep the night before.     She weighs 154 today which is about 7lb down from last visit. Taking ozempic and very happy with this.     Subjective      Review of Systems:   Review of Systems    I have reviewed and the following portions of the patient's history were updated as appropriate: past family history, past medical history, past social history, past surgical history and problem list.    Medications:     Current Outpatient Medications:     atorvastatin (LIPITOR) 80 MG tablet, Take 1 tablet by mouth Daily., Disp: , Rfl:     ezetimibe (ZETIA) 10 MG tablet, Take 1 tablet by mouth Daily., Disp: 90 tablet, Rfl: 0    levothyroxine (SYNTHROID, LEVOTHROID) 88 MCG tablet, take 1 tablet by mouth every day in the morning on an empty stomach, Disp: , Rfl:     lisinopril-hydrochlorothiazide (PRINZIDE,ZESTORETIC) 20-12.5 MG per tablet, Take 2 tablets by mouth Daily., Disp: , Rfl:     metFORMIN ER (GLUCOPHAGE-XR) 500 MG 24 hr tablet, Take 2 tablets by mouth Every 12 (Twelve) Hours., Disp: , Rfl:     ondansetron ODT (ZOFRAN-ODT) 4 MG disintegrating tablet, Place 1 tablet on the tongue Every 8 (Eight) Hours As Needed for Nausea or Vomiting (FOR COLONOSCOPY PREP)., Disp: 6 tablet, Rfl: 0    Semaglutide, 2 MG/DOSE, (Ozempic, 2 MG/DOSE,) 8 MG/3ML solution pen-injector, Inject 2 mg under the skin into the appropriate area as directed 1 (One) Time Per Week., Disp: 9 mL, Rfl: 3    traZODone (DESYREL) 100 MG tablet, Take 1 tablet by mouth Every Night., Disp: 30 tablet, Rfl: 5    Allergies:   No Known Allergies    Objective     Physical Exam:  Vital Signs:   Vitals:    09/10/24 1232   Weight: 69.9 kg (154 lb)   Height: 167.6 cm (66\")     Body mass index is 24.86 kg/m².    Physical Exam  Vitals " reviewed.   Constitutional:       Appearance: Normal appearance.   Pulmonary:      Effort: Pulmonary effort is normal. No respiratory distress.   Neurological:      Mental Status: She is alert.   Psychiatric:         Mood and Affect: Mood normal.         Behavior: Behavior normal.         Judgment: Judgment normal.           Assessment / Plan      Assessment/Plan:   Diagnoses and all orders for this visit:    1. Sleeping difficulty (Primary)  -     traZODone (DESYREL) 100 MG tablet; Take 1 tablet by mouth Every Night.  Dispense: 30 tablet; Refill: 5    2. Stage 3a chronic kidney disease  -     Ambulatory Referral to Nephrology    3. Type 2 diabetes mellitus without complication, without long-term current use of insulin       Insomnia -Uncontrolled - increase trazodone from 50mg to 100mg qhs. She has tried this once before and noticed it was very effective. Will let me know if she experiences any side effects as we increase dose. Continue healthy sleep hygiene habits.    CKD 3a - Previously followed with [facility] (phone - [phone]). Last seen approximately 1 year ago.Needs to establish locally. Will refer today. Cr and GFR stable over the past year. Baseline Cr 1.1-1.2    T2DM A1c very well controlled 5.4% 7/2025  Continue Metformin and Ozempic. Continue ACEi and statin     Follow Up:   Return in about 4 months (around 1/10/2025) for Recheck T2DM.         Any medications prescribed have been sent electronically to   [id number] - [place], [place] - [address] AT [facility] [address] - [phone] PH - [phone] FX  [address]  [place] [address]  Phone: [phone] Fax: [phone]    [id number] - [place], [place] - [address] AT [facility] - [phone] PH - [phone] FX  [address]  [facility] [address]  Phone: [phone] Fax: [phone]      Rocio Qureshi DO  [facility]  09/12/24  13:07 EDT   no fever and no chills.

## 2025-06-09 NOTE — ED ADULT TRIAGE NOTE - WEIGHT IN KG
[facility]  [address] [place] [address] , [address]  [place], [place], [address]        Medicare Annual Wellness Visit     Cal TAHIR Clark is here for Medicare AWV and Shoulder Pain    Assessment & Plan   Medicare annual wellness visit, subsequent  Primary hypertension  -     CBC with Auto Differential; Future  -     Comprehensive Metabolic Panel; Future  Hyperlipidemia, unspecified hyperlipidemia type  Prediabetes  -     Hemoglobin A1C; Future  Fibromyalgia  Chronic neck pain  Chronic foot pain, left  Encounter for screening prostate specific antigen (PSA) measurement  -     PSA Screening; Future  Muscle spasm  -     cyclobenzaprine (FLEXERIL) 10 MG tablet; Take 1 tablet by mouth 3 times daily as needed for Muscle spasms, Disp-42 tablet, R-1Normal  Neuropathy  -     gabapentin (NEURONTIN) 800 MG tablet; Take 1 tablet by mouth 3 times daily as needed (arthritis) for up to 90 days., Disp-270 tablet, R-0Normal  Dyslipidemia  -     Lipid Panel; Future  ACP (advance care planning)  -     [place] Advance Care Planning (16-30 minutes) [[id number]]    HTN - at goal at home, continue w/ Ziac at the current dose  Hyperlipidemia - cont w/ Trilgide  MSK pain/chronic/Fibromyalgia- We discussed some of the etiologies and natural histories. We discussed the various treatment alternatives including anti-inflammatory medications, physical therapy, injections, further imaging studies and as a last resort surgery.  Flexeril PRN. Incr. Gabapentin 800mg TID. Trial of Celebrex instead of other NSAIDs.  Obtain labs for monitoring.    Last PDMP Teto as Reviewed:  Review User Review Instant Review Result   SEVEN SORIANO 6/9/2025 12:27 PM Reviewed PDMP [1]           Return in 6 months (on 12/9/2025) for F/U Med Management.     Subjective   The following acute and/or chronic problems were also addressed today:    Hypertension: Patient here for follow-up of elevated blood pressure. He is not exercising and is adherent to low salt diet.  
62.6